# Patient Record
Sex: FEMALE | Race: WHITE | NOT HISPANIC OR LATINO | Employment: FULL TIME
[De-identification: names, ages, dates, MRNs, and addresses within clinical notes are randomized per-mention and may not be internally consistent; named-entity substitution may affect disease eponyms.]

---

## 2018-07-04 ENCOUNTER — HEALTH MAINTENANCE LETTER (OUTPATIENT)
Age: 53
End: 2018-07-04

## 2018-08-16 ENCOUNTER — OFFICE VISIT (OUTPATIENT)
Dept: FAMILY MEDICINE | Facility: CLINIC | Age: 53
End: 2018-08-16

## 2018-08-16 VITALS
DIASTOLIC BLOOD PRESSURE: 86 MMHG | SYSTOLIC BLOOD PRESSURE: 142 MMHG | HEART RATE: 78 BPM | OXYGEN SATURATION: 98 % | BODY MASS INDEX: 45.99 KG/M2 | WEIGHT: 293 LBS | RESPIRATION RATE: 16 BRPM | HEIGHT: 67 IN

## 2018-08-16 DIAGNOSIS — E66.01 MORBID OBESITY (H): ICD-10-CM

## 2018-08-16 DIAGNOSIS — M25.521 ARTHRALGIA OF BOTH ELBOWS: ICD-10-CM

## 2018-08-16 DIAGNOSIS — M54.9 MID BACK PAIN ON LEFT SIDE: Primary | ICD-10-CM

## 2018-08-16 DIAGNOSIS — R19.4 CHANGE IN BOWEL HABITS: ICD-10-CM

## 2018-08-16 DIAGNOSIS — R10.32 LEFT LOWER QUADRANT PAIN: ICD-10-CM

## 2018-08-16 DIAGNOSIS — Z13.6 SCREENING FOR CARDIOVASCULAR CONDITION: ICD-10-CM

## 2018-08-16 DIAGNOSIS — M25.522 ARTHRALGIA OF BOTH ELBOWS: ICD-10-CM

## 2018-08-16 DIAGNOSIS — R53.83 OTHER FATIGUE: ICD-10-CM

## 2018-08-16 LAB
% GRANULOCYTES: 65.7 % (ref 42.2–75.2)
ERYTHROCYTE [SEDIMENTATION RATE] IN BLOOD: 15 MM/HR (ref 0–20)
HCT VFR BLD AUTO: 34.2 % (ref 35–46)
HEMOGLOBIN: 11.3 G/DL (ref 11.8–15.5)
LYMPHOCYTES NFR BLD AUTO: 27.3 % (ref 20.5–51.1)
MCH RBC QN AUTO: 25.8 PG (ref 27–31)
MCHC RBC AUTO-ENTMCNC: 33.2 G/DL (ref 33–37)
MCV RBC AUTO: 77.7 FL (ref 80–100)
MONOCYTES NFR BLD AUTO: 7 % (ref 1.7–9.3)
PLATELET # BLD AUTO: 431 K/UL (ref 140–450)
RBC # BLD AUTO: 4.39 X10/CMM (ref 3.7–5.2)
WBC # BLD AUTO: 11 X10/CMM (ref 3.8–11)

## 2018-08-16 PROCEDURE — 36415 COLL VENOUS BLD VENIPUNCTURE: CPT | Performed by: FAMILY MEDICINE

## 2018-08-16 PROCEDURE — 86140 C-REACTIVE PROTEIN: CPT | Mod: 90 | Performed by: FAMILY MEDICINE

## 2018-08-16 PROCEDURE — 80053 COMPREHEN METABOLIC PANEL: CPT | Mod: 90 | Performed by: FAMILY MEDICINE

## 2018-08-16 PROCEDURE — 80050 GENERAL HEALTH PANEL: CPT | Mod: 90 | Performed by: FAMILY MEDICINE

## 2018-08-16 PROCEDURE — 86787 VARICELLA-ZOSTER ANTIBODY: CPT | Mod: 90 | Performed by: FAMILY MEDICINE

## 2018-08-16 PROCEDURE — 84439 ASSAY OF FREE THYROXINE: CPT | Mod: 90 | Performed by: FAMILY MEDICINE

## 2018-08-16 PROCEDURE — 99204 OFFICE O/P NEW MOD 45 MIN: CPT | Performed by: FAMILY MEDICINE

## 2018-08-16 PROCEDURE — 84480 ASSAY TRIIODOTHYRONINE (T3): CPT | Mod: 90 | Performed by: FAMILY MEDICINE

## 2018-08-16 PROCEDURE — 85025 COMPLETE CBC W/AUTO DIFF WBC: CPT | Performed by: FAMILY MEDICINE

## 2018-08-16 PROCEDURE — 85651 RBC SED RATE NONAUTOMATED: CPT | Performed by: FAMILY MEDICINE

## 2018-08-16 PROCEDURE — 84443 ASSAY THYROID STIM HORMONE: CPT | Mod: 90 | Performed by: FAMILY MEDICINE

## 2018-08-16 RX ORDER — ACETAMINOPHEN AND CODEINE PHOSPHATE 120; 12 MG/5ML; MG/5ML
1 SOLUTION ORAL DAILY
COMMUNITY

## 2018-08-16 RX ORDER — OMEGA-3 FATTY ACIDS/FISH OIL 300-1000MG
800 CAPSULE ORAL PRN
COMMUNITY

## 2018-08-16 NOTE — MR AVS SNAPSHOT
After Visit Summary   8/16/2018    Ashli Narvaez    MRN: [id number]           Patient Information     Date Of Birth          1965        Visit Information        Provider Department      8/16/2018 1:45 PM Yandy Harris MD [facility]        Today's Diagnoses     Mid back pain on left side    -  1    Morbid obesity (H)        Left lower quadrant pain        Screening for cardiovascular condition        Other fatigue        Change in bowel habits        Arthralgia of both elbows           Follow-ups after your visit        Additional Services     BARIATRIC ADULT REFERRAL       [facility] [phone]. [email or web address]    **Bariatric Nutrition Counseling**    Please be aware that coverage of these services is subject to the terms and limitations of your health insurance plan.  Call member services at your health plan with any benefit or coverage questions.      Please bring the following with you to your appointment:      (1) List of current medications   (2) This referral request   (3) Any documents/labs given to you for this referral            BARIATRIC ADULT REFERRAL       Your provider [facility] [phone]. [email or web address]    Please be aware that coverage of these services is subject to the terms and limitations of your health insurance plan.  Call member services at your health plan with any benefit or coverage questions.      Please bring the following with you to your appointment:      (1) List of current medications   (2) This referral request   (3) Any documents/labs given to you for this referral            GASTROENTEROLOGY ADULT REF PROCEDURE ONLY       Reason for Colonoscopy  Change in bowel habits    Last Lab Result: No results found for: CR  Body mass index is 57.04 kg/(m^2).     Needed:  No  Language:  English    Patient will be contacted to schedule procedure.     Please be aware that coverage of these services is subject to the terms and limitations of your health insurance plan.  Call member services at your health plan with any benefit or coverage questions.  Any procedures must be performed at a [facility] facility OR coordinated by your clinic's referral office.    Please bring the following with you to your appointment:    (1) Any X-Rays, CTs or MRIs which have been performed.  Contact the facility where they were done to arrange for  prior to your scheduled appointment.    (2) List of current medications   (3) This referral request   (4) Any documents/labs given to you for this referral            Referral to [facility]       Referral to [facility]  Phone:  [phone]    Reason for Referral:  Consult, Diagnose & Treat, Ongoing elbow pain, bilaterally    Please be aware that coverage of these services is subject to the terms and limitations of your health insurance plan.  Call member services at your health plan with any benefit or coverage questions.                  Who to contact     If you have questions or need follow up information about today's clinic visit or your schedule please contact [facility] directly at [phone].  Normal or non-critical lab and imaging results will be communicated to you by MyChart, letter or phone within 4 business days after the clinic has received the results. If you do not hear from us within 7 days, please contact the clinic through MyChart or phone. If you have a critical or abnormal lab result, we will notify you by phone as soon as possible.  Submit refill requests through [employer] or call your pharmacy and they will forward the refill request to  "us. Please allow 3 business days for your refill to be completed.          Additional Information About Your Visit        [facility] Information     [employer] lets you send messages to your doctor, view your test results, renew your prescriptions, schedule appointments and more. To sign up, go to [email or web address] . Click on \"Log in\" on the left side of the screen, which will take you to the Welcome page. Then click on \"Sign up Now\" on the right side of the page.     You will be asked to enter the access code listed below, as well as some personal information. Please follow the directions to create your username and password.     Your access code is: [email or web address]  Expires: 2018  3:36 PM     Your access code will  in 90 days. If you need help or a new code, please call your [facility] clinic or [phone].        Care EveryWhere ID     This is your Care EveryWhere ID. This could be used by other organizations to access your [facility] medical records  [id number]        Your Vitals Were     Pulse Respirations Height Last Period Pulse Oximetry Breastfeeding?    78 16 1.702 m (5' 7\") 2018 98% No    BMI (Body Mass Index)                   57.04 kg/m2            Blood Pressure from Last 3 Encounters:   18 142/86    Weight from Last 3 Encounters:   18 (!) 165.2 kg (364 lb 3.2 oz)              We Performed the Following     BARIATRIC ADULT REFERRAL     BARIATRIC ADULT REFERRAL     C-Reactive Protein  Quant ([facility])     CBC with Diff/Plt ([facility])     Comp. Metabolic Panel (14) ([facility])     GASTROENTEROLOGY ADULT REF PROCEDURE ONLY     Referral to [facility]     Sed Rate Westergren ([facility])     Thyroxine (T4) Free  Direct  S ([facility])     Triiodothyronine (T3) ([facility])     TSH ([facility])     Varicella-Zoster Ab  IgM ([facility])        Primary Care Provider Office Phone # Fax #    Yandy Denise Harris [phone]       [address] " [facility] [address]        Equal Access to Services     [facility] PavilionESTHELA : Hadii aad ku hadamialda Sojollyali, wafranciscoda luqadaha, qaybta kaalmaabran torresramónthom wagner. So [facility] [phone].    ATENCIÓN: Si habla español, tiene a damon disposición servicios gratuitos de asistencia lingüística. Llame al [phone].    We comply with applicable federal civil rights laws and [place] laws. We do not discriminate on the basis of race, color, national origin, age, disability, sex, sexual orientation, or gender identity.            Thank you!     Thank you for choosing [facility]  for your care. Our goal is always to provide you with excellent care. Hearing back from our patients is one way we can continue to improve our services. Please take a few minutes to complete the written survey that you may receive in the mail after your visit with us. Thank you!             Your Updated Medication List - Protect others around you: Learn how to safely use, store and throw away your medicines at www.disposemymeds.org.          This list is accurate as of 8/16/18  3:36 PM.  Always use your most recent med list.                   Brand Name Dispense Instructions for use Diagnosis    ibuprofen 200 MG capsule      Take 800 mg by mouth as needed        norethindrone 0.35 MG per tablet    MICRONOR     Take 1 tablet by mouth daily

## 2018-08-16 NOTE — LETTER
[facility]  [address] [place] [address]  [phone]      August 16, 2018      Ashli Narvaez  [address]  [facility] [address]      EMERGENCY CARE PLAN  Presenting Problem Treatment Plan   Questions or concerns during clinic hours I will call the clinic directly:    [facility]  [address] [place] [address]  [phone]   Questions or concerns outside clinic hours I will call the after-hours on-call line at [phone]   Patient needs to schedule an appointment I will call the scheduling team during business hours at [phone]   Same day treatment  I will call the clinic first, then urgent care and express care if needed   Clinic Care Coordinators GORAN Valentine:  [phone]  Ankita Quintana RN: [phone]   Crisis Services:  Behavioral or Mental Health [facility] ([facility])   [phone]   Emergency treatment--Immediately CALL 1

## 2018-08-16 NOTE — PROGRESS NOTES
"Problem(s) Oriented visit        SUBJECTIVE:                                                    Ashli Narvaez is a 53 year old female who presents to clinic today for establishing care. [facility]. Pelvic U/S done recently and was reported as normal.    She reports about 3 months of feeling \"awful.\" She started with terrible pain on the left back that would come across her back to her abdomen. It feels like a toothache pain. Her bowel movements are more loose. She now feels that she needs to move her bowels right away after eating. The pain never crossed over the midline, either on the abdomen or the back. No hematochezia or melena.    She complains of significantly painful bilateral elbow pain that feel as though they will come out of joint if she fully extends her elbows. This correlates in time with the terrible back pain. No finger, wrist, shoulder, foot, or ankle pain. She does have bilateral knee pain which has worsened in the past three months as well. No obvious swelling or redness in the joints.    She snores, but no apneic breathing. She wakes fine, but is quite a bit more tired than usual in the past three months. No fevers. No unexplained weight change.       Problem list, Medication list, Allergies, and Medical/Social/Surgical histories reviewed in EPIC and updated as appropriate.   Additional history: as documented    ROS:  7 point ROS completed and negative except noted above, including Gen, CV, Resp, GI, MS, Derm, psych. She does report heavier menses that was worked up by GYN.      Histories:   Patient Active Problem List   Diagnosis     Morbid obesity (H)     No past surgical history on file.    Social History   Substance Use Topics     Smoking status: Never Smoker     Smokeless tobacco: Never Used     Alcohol use Not on file     Family History   Problem Relation Age of Onset     Pulmonary Embolism Mother 50     Lung Cancer Brother            OBJECTIVE:                 " "                                   /86  Pulse 78  Resp 16  Ht 1.702 m (5' 7\")  Wt (!) 165.2 kg (364 lb 3.2 oz)  LMP 07/27/2018  SpO2 98%  Breastfeeding? No  BMI 57.04 kg/m2  Body mass index is 57.04 kg/(m^2).   GENERAL APPEARANCE ADULT: Alert, no acute distress, morbidly obese  NECK: No adenopathy,masses or thyromegaly  RESP: lungs clear to auscultation   CV: normal rate, regular rhythm, no murmur or gallop  ABDOMEN: difficult exam due to morbid obesity, soft, diffuse mild tenderness without rebound or guarding, non-distended, no appreciable masses  MS: pitting edema is seen in both feet  SKIN: no suspicious lesions or rashes, specifically no vesicles or erythema in the area where the painful ache has been on the left abdomen and back.  NEURO: Alert, oriented, speech and mentation normal  PSYCH: mentation appears normal, affect and mood normal   Labs Resulted Today:   Results for orders placed or performed in visit on 08/16/18   CBC with Diff/Plt ([facility])   Result Value Ref Range    WBC x10/cmm 11.0 3.8 - 11.0 x10/cmm    % Lymphocytes 27.3 20.5 - 51.1 %    % Monocytes 7.0 1.7 - 9.3 %    % Granulocytes 65.7 42.2 - 75.2 %    RBC x10/cmm 4.39 3.7 - 5.2 x10/cmm    Hemoglobin 11.3 (A) 11.8 - 15.5 g/dl    Hematocrit 34.2 (A) 35 - 46 %    MCV 77.7 (A) 80 - 100 fL    MCH 25.8 (A) 27.0 - 31.0 pg    MCHC 33.2 33.0 - 37.0 g/dL    Platelet Count 431 140 - 450 K/uL     ASSESSMENT/PLAN:                                                        Ashli was seen today for new patient.    Diagnoses and all orders for this visit:    Mid back pain on left side/Left lower quadrant pain  -     Sed Rate Xavier ([facility])  -     C-Reactive Protein  Quant ([facility])  -     Varicella-Zoster Ab  IgM ([facility])  I'm highly suspicious for varicella-zoster given the distribution of and description of her pain. Follow-up pending lab results.    Morbid obesity (H)  -     BARIATRIC ADULT REFERRAL for nutrition counseling.  She is adamant that " she does not want to consider surgery at this time.  She is encouraged to be open minded about the possibility of bariatric surgery.  Co-morbidities associated with morbid obesity we discussed at length.    Screening for cardiovascular condition  -     Comp. Metabolic Panel (14) ([facility])    Other fatigue  -     Sed Rate Westergren ([facility])  -     C-Reactive Protein  Quant ([facility])  -     CBC with Diff/Plt ([facility])  -     TSH ([facility])  -     Thyroxine (T4) Free  Direct  S ([facility])  -     Triiodothyronine (T3) ([facility])    Change in bowel habits  -     GASTROENTEROLOGY ADULT REF PROCEDURE ONLY, no history of colonoscopy, need to proceed with one now considering her discomfort and change in bowel habits.    Arthralgia of both elbows  -     Referral to [facility]      There are no Patient Instructions on file for this visit.    The following health maintenance items are reviewed in Epic and correct as of today:  Health Maintenance   Topic Date Due     PHQ-2 Q1 YR  02/24/1978     HIV SCREEN (SYSTEM ASSIGNED)  02/24/1984     PAP SCREENING Q3 YR (SYSTEM ASSIGNED)  02/24/1987     LIPID SCREEN Q5 YR FEMALE (SYSTEM ASSIGNED)  02/24/2011     MAMMO SCREEN Q2 YR (SYSTEM ASSIGNED)  02/24/2016     COLON CANCER SCREEN (SYSTEM ASSIGNED)  02/24/2016     INFLUENZA VACCINE (1) 09/01/2018     TETANUS IMMUNIZATION (SYSTEM ASSIGNED)  11/19/2024       Yandy Harris MD  [facility]  [facility]  [phone]    For any issues my office # is [phone]

## 2018-08-20 LAB
ALBUMIN SERPL-MCNC: 4.2 G/DL (ref 3.5–5.5)
ALBUMIN/GLOB SERPL: 1.5 {RATIO} (ref 1.2–2.2)
ALP SERPL-CCNC: 101 IU/L (ref 39–117)
ALT SERPL-CCNC: 11 IU/L (ref 0–32)
AST SERPL-CCNC: 9 IU/L (ref 0–40)
BILIRUB SERPL-MCNC: <0.2 MG/DL (ref 0–1.2)
BUN SERPL-MCNC: 11 MG/DL (ref 6–24)
BUN/CREATININE RATIO: 14 (ref 9–23)
CALCIUM SERPL-MCNC: 9 MG/DL (ref 8.7–10.2)
CHLORIDE SERPLBLD-SCNC: 106 MMOL/L (ref 96–106)
CREAT SERPL-MCNC: 0.79 MG/DL (ref 0.57–1)
CRP SERPL-MCNC: 10.2 MG/L (ref 0–4.9)
EGFR IF AFRICN AM: 100 ML/MIN/1.73
EGFR IF NONAFRICN AM: 86 ML/MIN/1.73
GLOBULIN, TOTAL: 2.8 G/DL (ref 1.5–4.5)
GLUCOSE SERPL-MCNC: 84 MG/DL (ref 65–99)
POTASSIUM SERPL-SCNC: 4.7 MMOL/L (ref 3.5–5.2)
PROT SERPL-MCNC: 7 G/DL (ref 6–8.5)
SODIUM SERPL-SCNC: 144 MMOL/L (ref 134–144)
T3 SERPL-MCNC: 123 NG/DL (ref 71–180)
T4 FREE SERPL-MCNC: 0.89 NG/DL (ref 0.82–1.77)
TOTAL CO2: 26 MMOL/L (ref 20–29)
TSH BLD-ACNC: 2.74 UIU/ML (ref 0.45–4.5)
VZV IGG SER QL IA: <0.91 INDEX (ref 0–0.9)

## 2018-08-23 ENCOUNTER — TELEPHONE (OUTPATIENT)
Dept: FAMILY MEDICINE | Facility: CLINIC | Age: 53
End: 2018-08-23

## 2018-08-23 DIAGNOSIS — E61.1 LOW IRON: Primary | ICD-10-CM

## 2018-08-23 RX ORDER — FERROUS SULFATE 325(65) MG
325 TABLET ORAL
Qty: 90 TABLET | Refills: 2 | COMMUNITY
Start: 2018-08-23

## 2018-08-23 NOTE — TELEPHONE ENCOUNTER
Called patient with lab results.  Informed her of normal labs-sed rate, shingles, thyroid, glucose, electrolytes, kidney function and liver function.  Blood counts are mildly low and indicates patient has iron deficiency anemia.  Recommended is for patient to take ferrous sulfate 325 mg daily with stool softener.  She will take this for 2 months and then repeat blood work.

## 2018-08-23 NOTE — TELEPHONE ENCOUNTER
Also asked patient about flank pain and she says it has improved.  Not totally gone but she will observe and see if it gets worse she will call and make an appointment.

## 2020-01-29 ENCOUNTER — OFFICE VISIT (OUTPATIENT)
Dept: ORTHOPEDICS | Facility: CLINIC | Age: 55
End: 2020-01-29
Payer: COMMERCIAL

## 2020-01-29 ENCOUNTER — ANCILLARY PROCEDURE (OUTPATIENT)
Dept: GENERAL RADIOLOGY | Facility: CLINIC | Age: 55
End: 2020-01-29
Attending: PEDIATRICS
Payer: COMMERCIAL

## 2020-01-29 VITALS
BODY MASS INDEX: 45.99 KG/M2 | SYSTOLIC BLOOD PRESSURE: 142 MMHG | HEIGHT: 67 IN | DIASTOLIC BLOOD PRESSURE: 92 MMHG | WEIGHT: 293 LBS

## 2020-01-29 DIAGNOSIS — M25.562 ACUTE PAIN OF LEFT KNEE: ICD-10-CM

## 2020-01-29 DIAGNOSIS — M25.562 ACUTE PAIN OF LEFT KNEE: Primary | ICD-10-CM

## 2020-01-29 PROCEDURE — 73562 X-RAY EXAM OF KNEE 3: CPT

## 2020-01-29 PROCEDURE — 99204 OFFICE O/P NEW MOD 45 MIN: CPT | Performed by: PEDIATRICS

## 2020-01-29 RX ORDER — DICLOFENAC SODIUM 75 MG/1
75 TABLET, DELAYED RELEASE ORAL 2 TIMES DAILY PRN
Qty: 60 TABLET | Refills: 0 | Status: SHIPPED | OUTPATIENT
Start: 2020-01-29 | End: 2020-03-02

## 2020-01-29 ASSESSMENT — MIFFLIN-ST. JEOR: SCORE: 2256.97

## 2020-01-29 NOTE — LETTER
[facility]  [address]  [place] [address]  Phone: [phone]  Fax: [phone]      January 29, 2020      RE: Ashli Narvaez  [address]  [facility] [address]        To whom it may concern:    Ashli Narvaez was seen today for a musculoskeletal issue. The employee is ABLE to return to work next scheduled work date.        Sincerely,            Rylan PRESSLEY.

## 2020-01-29 NOTE — PATIENT INSTRUCTIONS
Left knee pain likely from early underlying degenerative change, particularly under the kneecap.  For this, discussed icing, anti-inflammatory medication.  Diclofenac prescription placed. Do not use ibuprofen with diclofenac.  May use acetaminophen with diclofenac.   Over the counter medication: Acetaminophen (Tylenol) 1000mg every 6 hours with food (Maximum of 3000mg/day)  Monitor for next 1-2 weeks with therapy and medication. If ongoing symptoms, we discussed potential for doing an MRI (if not improving, or worsening/changing), or possibly physical therapy (some improvement but not complete).

## 2020-01-29 NOTE — LETTER
1/29/2020         RE: Ashli Narvaez  [address]  [facility] [address]        Dear Colleague,    Thank you for referring your patient, Ashli Narvaez, to the [facility] [place]. Please see a copy of my visit note below.    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  Pain has been present for about 1 month with no known injury.  She has had an increase in pain for the past 3 days.  She is unsure if she did anything, as she does not recall, but remembers walking in an icy parking lot.  Is unable to get her leg into the car, has to physically lift her leg to get into the car.  Feels she is unable to flex her knee.  Pain over the anterior and posterior aspect of her knee.      **    When she first noticed the pain, noticed a slight crackle in her left knee. At first, pain was intermittent. Currently, pain is constant. Notes pain radiates up through thigh and down calf. Last night, noted swelling in left ankle. No recent history of a blood clot. Cannot get into car due to pain.    Takes 800 mg twice per day of Ibuprofen without relief.    Injury: gradual onset     Location of Pain: left knee  Duration of Pain: 1 month(s)  Rating of Pain at worst: 9/10  Rating of Pain Currently: 6/10  Symptoms are better with: Nothing  Symptoms are worse with: flexion, walking   Additional Features:   Positive: popping   Negative: swelling, bruising, grinding, catching, locking, instability, paresthesias, numbness, weakness, pain in other joints and systemic symptoms  Other evaluation and/or treatments so far consists of: Ice  Prior History of related problems: denies     Social History:      Review of Systems  Musculoskeletal: as above  Remainder of review of systems is negative including constitutional, CV, pulmonary, GI, Skin and Neurologic except as  noted in HPI or medical history.      Patient Active Problem List   Diagnosis     Morbid obesity (H)       Past Medical History:   Diagnosis Date     Depressive disorder        Past Surgical History:   Procedure Laterality Date     NO HISTORY OF SURGERY         No significant past surgical history    Family History   Problem Relation Age of Onset     Pulmonary Embolism Mother 50     Lung Cancer Brother      Social History     Socioeconomic History     Marital status:      Spouse name: Not on file     Number of children: Not on file     Years of education: Not on file     Highest education level: Not on file   Occupational History     Not on file   Social Needs     Financial resource strain: Not on file     Food insecurity:     Worry: Not on file     Inability: Not on file     Transportation needs:     Medical: Not on file     Non-medical: Not on file   Tobacco Use     Smoking status: Never Smoker     Smokeless tobacco: Never Used   Substance and Sexual Activity     Alcohol use: Not on file     Drug use: Not on file     Sexual activity: Not on file   Lifestyle     Physical activity:     Days per week: Not on file     Minutes per session: Not on file     Stress: Not on file   Relationships     Social connections:     Talks on phone: Not on file     Gets together: Not on file     Attends Episcopal service: Not on file     Active member of club or organization: Not on file     Attends meetings of clubs or organizations: Not on file     Relationship status: Not on file     Intimate partner violence:     Fear of current or ex partner: Not on file     Emotionally abused: Not on file     Physically abused: Not on file     Forced sexual activity: Not on file   Other Topics Concern     Not on file   Social History Narrative     Not on file     This document serves as a record of the services and decisions personally performed and made by DO NAVIN Burris. It was created on his behalf by soni Jackson  "medical scribe. The creation of this document is based the provider's statements to the medical scribe.    Ganga Neely 8:58 AM 1/29/2020       Objective  BP (!) 142/92   Ht 1.702 m (5' 7\")   Wt (!) 161.9 kg (357 lb)   BMI 55.91 kg/m       GENERAL APPEARANCE: alert, no distress and obese   GAIT: antalgic  SKIN: no suspicious lesions or rashes  NEURO: Normal strength and tone, mentation intact and speech normal  PSYCH:  mentation appears normal and affect normal/bright  HEENT: no scleral icterus  CV: Distal perfusion intact.   RESP: nonlabored breathing     Left Knee exam    ROM:         Extension: Lacking 10-20 degrees actively, full passive        Active flexion: 90 degrees  Pain limits motion    Inspection:         Mildly swollen appearance with edema in left lower leg near foot         Mild edema right lower leg as well         No visible ecchymosis   No apparent effusion, though somewhat difficult to assess with habitus    Skin:       no visible deformities       well perfused       capillary refill brisk    Patellar Motion:        Crepitus noted in the patellofemoral joint    Tender:         Patellar tendon        Tibial tubercle          Lateral joint line         Lateral aspect of the patella    Non Tender:         remainder of knee area    Strength:        Extension: able to resist with no pain    Special Tests:         Ghislaine with pain to anteromedial aspect       neg (-) anterior drawer       neg (-) posterior drawer with anterior pain       neg (-) varus at 0 and 30 degrees       neg (-) valgus at 0 and 30 degrees  Mild pain with forced extension    Radiology  Visualized radiographs of the bilateral knee obtained today, and reviewed the images with the patient.  Impression: appears to be minimal to mild medial compartment narrowing bilaterally, slightly greater on the right.     Recent Results (from the past 24 hour(s))   XR Knee Standing AP Bilat [place] Bilat Lat Left    Narrative    KNEE " STANDING AP BILATERAL, SUNRISE BILATERAL, LATERAL LEFT 1/29/2020  8:46 AM     HISTORY: Acute pain of left knee.    COMPARISON: None.      Impression    IMPRESSION: Three views of the left knee show no bony or soft tissue  abnormality and no joint space effusion. Two views of the right knee  are included and show no acute bony abnormality, but there is possible  mild medial joint space narrowing.         Assessment:  1. Acute pain of left knee        Plan:  Discussed the assessment with the patient. Favor degenerative change, vs possible meniscus.    See patient instructions. She prefers medication, monitoring to start. Is interested in something stronger than ibuprofen. Discussed other NSAIDs, including diclofenac; she desired to try, rx placed.   Pertinent potential adverse effect profile of prescribed medication(s) was discussed with the patient, who expressed understanding.  **    Radiologic images reviewed and discussed with patient today   We discussed the following: symptom treatment, activity modification/rest, imaging, rehab, medication and support for the affected area. Following discussion, plan:     Topical treatments: Ice/heat prn  OTC medication prn  Medication: May take Acetaminophen (Tylenol) 1000mg every 6 hours with food (Maximum of 3000mg/day). Diclofenac prescribed. Do not take Diclofenac and Ibuprofen together.  Support: May use ambulatory options as desired. She has a cane at home she may use. Compression options reviewed in clinic today.  Imaging: Discussed the possibility of an MRI. Will hold off for now  Rehab: Home exercises reviewed in clinic. PT may be a future consideration.  Follow up: 1-2 weeks if not improving well, sooner if needed  Future considerations: MRI, PT  Questions answered.  Patient demonstrated understanding of these issues and agrees with the plan.     Rylan Lyn DO, CAQ            Patient Instructions   Left knee pain likely from early underlying degenerative  Statement Selected change, particularly under the kneecap.  For this, discussed icing, anti-inflammatory medication.  Diclofenac prescription placed. Do not use ibuprofen with diclofenac.  May use acetaminophen with diclofenac.   Over the counter medication: Acetaminophen (Tylenol) 1000mg every 6 hours with food (Maximum of 3000mg/day)  Monitor for next 1-2 weeks with therapy and medication. If ongoing symptoms, we discussed potential for doing an MRI (if not improving, or worsening/changing), or possibly physical therapy (some improvement but not complete).          Disclaimer: This note consists of symbols derived from keyboarding, dictation and/or voice recognition software. As a result, there may be errors in the script that have gone undetected. Please consider this when interpreting information found in this chart.    The information in this document, created by a scribe for me, accurately reflects the services I personally performed and the decisions made by me. I have reviewed and approved this document for accuracy.            Again, thank you for allowing me to participate in the care of your patient.        Sincerely,        Rylan Lyn, DO

## 2020-01-29 NOTE — PROGRESS NOTES
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  Pain has been present for about 1 month with no known injury.  She has had an increase in pain for the past 3 days.  She is unsure if she did anything, as she does not recall, but remembers walking in an icy parking lot.  Is unable to get her leg into the car, has to physically lift her leg to get into the car.  Feels she is unable to flex her knee.  Pain over the anterior and posterior aspect of her knee.      **    When she first noticed the pain, noticed a slight crackle in her left knee. At first, pain was intermittent. Currently, pain is constant. Notes pain radiates up through thigh and down calf. Last night, noted swelling in left ankle. No recent history of a blood clot. Cannot get into car due to pain.    Takes 800 mg twice per day of Ibuprofen without relief.    Injury: gradual onset     Location of Pain: left knee  Duration of Pain: 1 month(s)  Rating of Pain at worst: 9/10  Rating of Pain Currently: 6/10  Symptoms are better with: Nothing  Symptoms are worse with: flexion, walking   Additional Features:   Positive: popping   Negative: swelling, bruising, grinding, catching, locking, instability, paresthesias, numbness, weakness, pain in other joints and systemic symptoms  Other evaluation and/or treatments so far consists of: Ice  Prior History of related problems: denies     Social History:      Review of Systems  Musculoskeletal: as above  Remainder of review of systems is negative including constitutional, CV, pulmonary, GI, Skin and Neurologic except as noted in HPI or medical history.      Patient Active Problem List   Diagnosis     Morbid obesity (H)       Past Medical History:   Diagnosis Date     Depressive disorder        Past Surgical History:   Procedure Laterality Date     NO HISTORY OF SURGERY    "      No significant past surgical history    Family History   Problem Relation Age of Onset     Pulmonary Embolism Mother 50     Lung Cancer Brother      Social History     Socioeconomic History     Marital status:      Spouse name: Not on file     Number of children: Not on file     Years of education: Not on file     Highest education level: Not on file   Occupational History     Not on file   Social Needs     Financial resource strain: Not on file     Food insecurity:     Worry: Not on file     Inability: Not on file     Transportation needs:     Medical: Not on file     Non-medical: Not on file   Tobacco Use     Smoking status: Never Smoker     Smokeless tobacco: Never Used   Substance and Sexual Activity     Alcohol use: Not on file     Drug use: Not on file     Sexual activity: Not on file   Lifestyle     Physical activity:     Days per week: Not on file     Minutes per session: Not on file     Stress: Not on file   Relationships     Social connections:     Talks on phone: Not on file     Gets together: Not on file     Attends Gnosticist service: Not on file     Active member of club or organization: Not on file     Attends meetings of clubs or organizations: Not on file     Relationship status: Not on file     Intimate partner violence:     Fear of current or ex partner: Not on file     Emotionally abused: Not on file     Physically abused: Not on file     Forced sexual activity: Not on file   Other Topics Concern     Not on file   Social History Narrative     Not on file     This document serves as a record of the services and decisions personally performed and made by DO NAVIN Burris. It was created on his behalf by Leobardo Neely, a trained medical scribe. The creation of this document is based the provider's statements to the medical scribe.    Ganga Neely 8:58 AM 1/29/2020       Objective  BP (!) 142/92   Ht 1.702 m (5' 7\")   Wt (!) 161.9 kg (357 lb)   BMI 55.91 kg/m  "     GENERAL APPEARANCE: alert, no distress and obese   GAIT: antalgic  SKIN: no suspicious lesions or rashes  NEURO: Normal strength and tone, mentation intact and speech normal  PSYCH:  mentation appears normal and affect normal/bright  HEENT: no scleral icterus  CV: Distal perfusion intact.   RESP: nonlabored breathing     Left Knee exam    ROM:         Extension: Lacking 10-20 degrees actively, full passive        Active flexion: 90 degrees  Pain limits motion    Inspection:         Mildly swollen appearance with edema in left lower leg near foot         Mild edema right lower leg as well         No visible ecchymosis   No apparent effusion, though somewhat difficult to assess with habitus    Skin:       no visible deformities       well perfused       capillary refill brisk    Patellar Motion:        Crepitus noted in the patellofemoral joint    Tender:         Patellar tendon        Tibial tubercle          Lateral joint line         Lateral aspect of the patella    Non Tender:         remainder of knee area    Strength:        Extension: able to resist with no pain    Special Tests:         Ghislaine with pain to anteromedial aspect       neg (-) anterior drawer       neg (-) posterior drawer with anterior pain       neg (-) varus at 0 and 30 degrees       neg (-) valgus at 0 and 30 degrees  Mild pain with forced extension    Radiology  Visualized radiographs of the bilateral knee obtained today, and reviewed the images with the patient.  Impression: appears to be minimal to mild medial compartment narrowing bilaterally, slightly greater on the right.     Recent Results (from the past 24 hour(s))   XR Knee Standing AP Bilat [place] Bilat Lat Left    Narrative    KNEE STANDING AP BILATERAL, SUNRISE BILATERAL, LATERAL LEFT 1/29/2020  8:46 AM     HISTORY: Acute pain of left knee.    COMPARISON: None.      Impression    IMPRESSION: Three views of the left knee show no bony or soft tissue  abnormality and no joint  space effusion. Two views of the right knee  are included and show no acute bony abnormality, but there is possible  mild medial joint space narrowing.         Assessment:  1. Acute pain of left knee        Plan:  Discussed the assessment with the patient. Favor degenerative change, vs possible meniscus.    See patient instructions. She prefers medication, monitoring to start. Is interested in something stronger than ibuprofen. Discussed other NSAIDs, including diclofenac; she desired to try, rx placed.   Pertinent potential adverse effect profile of prescribed medication(s) was discussed with the patient, who expressed understanding.  **    Radiologic images reviewed and discussed with patient today   We discussed the following: symptom treatment, activity modification/rest, imaging, rehab, medication and support for the affected area. Following discussion, plan:     Topical treatments: Ice/heat prn  OTC medication prn  Medication: May take Acetaminophen (Tylenol) 1000mg every 6 hours with food (Maximum of 3000mg/day). Diclofenac prescribed. Do not take Diclofenac and Ibuprofen together.  Support: May use ambulatory options as desired. She has a cane at home she may use. Compression options reviewed in clinic today.  Imaging: Discussed the possibility of an MRI. Will hold off for now  Rehab: Home exercises reviewed in clinic. PT may be a future consideration.  Follow up: 1-2 weeks if not improving well, sooner if needed  Future considerations: MRI, PT  Questions answered.  Patient demonstrated understanding of these issues and agrees with the plan.     Rylan Lyn DO, CAQ            Patient Instructions   Left knee pain likely from early underlying degenerative change, particularly under the kneecap.  For this, discussed icing, anti-inflammatory medication.  Diclofenac prescription placed. Do not use ibuprofen with diclofenac.  May use acetaminophen with diclofenac.   Over the counter medication:  Acetaminophen (Tylenol) 1000mg every 6 hours with food (Maximum of 3000mg/day)  Monitor for next 1-2 weeks with therapy and medication. If ongoing symptoms, we discussed potential for doing an MRI (if not improving, or worsening/changing), or possibly physical therapy (some improvement but not complete).          Disclaimer: This note consists of symbols derived from keyboarding, dictation and/or voice recognition software. As a result, there may be errors in the script that have gone undetected. Please consider this when interpreting information found in this chart.    The information in this document, created by a scribe for me, accurately reflects the services I personally performed and the decisions made by me. I have reviewed and approved this document for accuracy.

## 2020-03-02 ENCOUNTER — ANCILLARY PROCEDURE (OUTPATIENT)
Dept: MRI IMAGING | Facility: CLINIC | Age: 55
End: 2020-03-02
Attending: PEDIATRICS
Payer: COMMERCIAL

## 2020-03-02 ENCOUNTER — OFFICE VISIT (OUTPATIENT)
Dept: ORTHOPEDICS | Facility: CLINIC | Age: 55
End: 2020-03-02
Payer: COMMERCIAL

## 2020-03-02 VITALS
BODY MASS INDEX: 45.99 KG/M2 | WEIGHT: 293 LBS | SYSTOLIC BLOOD PRESSURE: 138 MMHG | DIASTOLIC BLOOD PRESSURE: 88 MMHG | HEIGHT: 67 IN

## 2020-03-02 DIAGNOSIS — M25.562 ACUTE PAIN OF LEFT KNEE: Primary | ICD-10-CM

## 2020-03-02 DIAGNOSIS — M25.562 ACUTE PAIN OF LEFT KNEE: ICD-10-CM

## 2020-03-02 PROCEDURE — 73721 MRI JNT OF LWR EXTRE W/O DYE: CPT | Mod: TC

## 2020-03-02 PROCEDURE — 99214 OFFICE O/P EST MOD 30 MIN: CPT | Performed by: PEDIATRICS

## 2020-03-02 RX ORDER — DICLOFENAC SODIUM 75 MG/1
75 TABLET, DELAYED RELEASE ORAL 2 TIMES DAILY PRN
Qty: 60 TABLET | Refills: 0 | Status: SHIPPED | OUTPATIENT
Start: 2020-03-02

## 2020-03-02 ASSESSMENT — MIFFLIN-ST. JEOR: SCORE: 2251.97

## 2020-03-02 NOTE — PATIENT INSTRUCTIONS
Advanced imaging is done by appointment. Some insurance require a prior authorization to be completed which may delay the time until you are able to schedule your appointment.  Please call [place] [place] [facility] [place]: [phone] to schedule your MRI.  Depending on your availability you can usually schedule within the next 1-2 days.    The clinic will call you with results, if you have not heard from the clinic within 3-4 days following your MRI please contact us at the number listed below.   If you have any further questions for your physician or physician s care team you can call [phone] and use option 3 to leave a voice message. Calls received during business hours will be returned same day.

## 2020-03-02 NOTE — PROGRESS NOTES
"Sports Medicine Clinic Visit    PCP: No Ref-Primary, Physician    Ashli Narvaez is a 55 year old female who is seen in f/u up for Acute pain of left knee. Since last visit on 1/29/2020 patient has had an increase pain over the past 2 weeks.  She was improving after her first visit, but 2 weeks she had an incident where her knee gave out on her and she has noticed an increase in pain and swelling.  She did use the diclofenac and acetaminophen, which were helpful.  She is currently out of the diclofenac.  Has continued with ice and heat.  She does feel pain in the posterior knee, described as a geeta horse.    **  After last visit, had some improvement. No longer limping.  ~2 weeks ago had a painful pop with giving way. Continued symptoms since that time. Feels swollen, warm.  Sometimes in posterior knee feels like cramp.  No additional crack/noise since then.  Difficult to do sit to stand and then walk.    Pain anterior primarily.    Review of Systems  All other systems reviewed and are negative unless noted above.    Past Medical History:   Diagnosis Date     Depressive disorder      Past Surgical History:   Procedure Laterality Date     NO HISTORY OF SURGERY         Objective  /88   Ht 1.702 m (5' 7\")   Wt (!) 161.9 kg (357 lb)   BMI 55.91 kg/m      GENERAL APPEARANCE:  alert, no distress and obese   GAIT: antalgic  SKIN: no suspicious lesions or rashes  NEURO: Normal strength and tone, mentation intact and speech normal  PSYCH:  mentation appears normal and affect normal/bright  HEENT: no scleral icterus  CV: distal perfusion intact  RESP: nonlabored breathing      Exam  Left Knee exam    ROM:         Extension: grossly full active, with pain        Active flexion: >100 degrees  Pain limits motion    Inspection:        No visible ecchymosis   No apparent effusion, though somewhat difficult to assess with habitus; does have some visible fullness at knee    Skin:       no visible deformities       well " perfused       capillary refill brisk    Patellar Motion:        Crepitus noted in the patellofemoral joint    Tender:         Patellar tendon        Tibial tubercle          Lateral joint line         Lateral aspect of the patella  Superior patella, quad tendon    Non Tender:         remainder of knee area    Strength:        Extension: able to resist     Special Tests:         Ghislaine with pain to anteromedial aspect       neg (-) anterior drawer with anterior pain       neg (-) posterior drawer with anterior pain       neg (-) varus at 0 and 30 degrees       neg (-) valgus at 0 and 30 degrees  Mild pain with forced extension    Radiology  Prior x-ray reviewed:    Recent Results (from the past 24 hour(s))   XR Knee Standing AP Bilat [place] Bilat Lat Left    Narrative    KNEE STANDING AP BILATERAL, SUNRISE BILATERAL, LATERAL LEFT 1/29/2020  8:46 AM     HISTORY: Acute pain of left knee.    COMPARISON: None.      Impression    IMPRESSION: Three views of the left knee show no bony or soft tissue  abnormality and no joint space effusion. Two views of the right knee  are included and show no acute bony abnormality, but there is possible  mild medial joint space narrowing.         Assessment:  1. Acute pain of left knee        Plan:  Discussed the assessment with the patient.  Ongoing symptoms at left knee. Favor patellofemoral source given crepitus. Other internal derangement not excluded.  Discussed primary considerations of physical therapy and MRI next. Plan MRI. Potential PT pending results. Also discussed potential future consideration of injection pending results, though she is not particularly interested currently.   Refill diclofenac placed.   Pertinent potential adverse effect profile of prescribed medication(s) was discussed with the patient, who expressed understanding.  Updated letter for work.  Follow up: call with MRI results.  Questions answered. The patient indicates understanding of these issues and  agrees with the plan.    Rylan Lyn DO, CAQ            Patient Instructions    Advanced imaging is done by appointment. Some insurance require a prior authorization to be completed which may delay the time until you are able to schedule your appointment.  Please call [place] [place] [facility] [place]: [phone] to schedule your MRI.  Depending on your availability you can usually schedule within the next 1-2 days.    The clinic will call you with results, if you have not heard from the clinic within 3-4 days following your MRI please contact us at the number listed below.   If you have any further questions for your physician or physician s care team you can call [phone] and use option 3 to leave a voice message. Calls received during business hours will be returned same day.              Disclaimer: This note consists of symbols derived from keyboarding, dictation and/or voice recognition software. As a result, there may be errors in the script that have gone undetected. Please consider this when interpreting information found in this chart.

## 2020-03-02 NOTE — LETTER
[facility] [place]  [address] [address]  [place] [address]  Phone: [phone]  Fax: [phone]      March 2, 2020      RE: Ashli Narvaez  [address]  [facility] [address]        To whom it may concern:    Ashli Narvaez was seen today in [facility] for a musculoskeletal issue. The employee is ABLE to return to work next scheduled work date. Planning additional imaging with MRI next.        Sincerely,            Rylan GONSALVES

## 2020-03-02 NOTE — LETTER
"    3/2/2020         RE: Ashli Narvaez  [address]  [facility] [address]        Dear Colleague,    Thank you for referring your patient, Ashli Narvaez, to the [facility]. Please see a copy of my visit note below.    Sports Medicine Clinic Visit    PCP: No Ref-Primary, Physician    Ashli Narvaez is a 55 year old female who is seen in f/u up for Acute pain of left knee. Since last visit on 1/29/2020 patient has had an increase pain over the past 2 weeks.  She was improving after her first visit, but 2 weeks she had an incident where her knee gave out on her and she has noticed an increase in pain and swelling.  She did use the diclofenac and acetaminophen, which were helpful.  She is currently out of the diclofenac.  Has continued with ice and heat.  She does feel pain in the posterior knee, described as a geeta horse.    **  After last visit, had some improvement. No longer limping.  ~2 weeks ago had a painful pop with giving way. Continued symptoms since that time. Feels swollen, warm.  Sometimes in posterior knee feels like cramp.  No additional crack/noise since then.  Difficult to do sit to stand and then walk.    Pain anterior primarily.    Review of Systems  All other systems reviewed and are negative unless noted above.    Past Medical History:   Diagnosis Date     Depressive disorder      Past Surgical History:   Procedure Laterality Date     NO HISTORY OF SURGERY         Objective  /88   Ht 1.702 m (5' 7\")   Wt (!) 161.9 kg (357 lb)   BMI 55.91 kg/m       GENERAL APPEARANCE:  alert, no distress and obese   GAIT: antalgic  SKIN: no suspicious lesions or rashes  NEURO: Normal strength and tone, mentation intact and speech normal  PSYCH:  mentation appears normal and affect normal/bright  HEENT: no scleral icterus  CV: distal perfusion intact  RESP: nonlabored breathing      Exam  Left Knee exam    ROM:         Extension: grossly full active, with pain        " Active flexion: >100 degrees  Pain limits motion    Inspection:        No visible ecchymosis   No apparent effusion, though somewhat difficult to assess with habitus; does have some visible fullness at knee    Skin:       no visible deformities       well perfused       capillary refill brisk    Patellar Motion:        Crepitus noted in the patellofemoral joint    Tender:         Patellar tendon        Tibial tubercle          Lateral joint line         Lateral aspect of the patella  Superior patella, quad tendon    Non Tender:         remainder of knee area    Strength:        Extension: able to resist     Special Tests:         Ghislaine with pain to anteromedial aspect       neg (-) anterior drawer with anterior pain       neg (-) posterior drawer with anterior pain       neg (-) varus at 0 and 30 degrees       neg (-) valgus at 0 and 30 degrees  Mild pain with forced extension    Radiology  Prior x-ray reviewed:    Recent Results (from the past 24 hour(s))   XR Knee Standing AP Bilat [place] Bilat Lat Left    Narrative    KNEE STANDING AP BILATERAL, SUNRISE BILATERAL, LATERAL LEFT 1/29/2020  8:46 AM     HISTORY: Acute pain of left knee.    COMPARISON: None.      Impression    IMPRESSION: Three views of the left knee show no bony or soft tissue  abnormality and no joint space effusion. Two views of the right knee  are included and show no acute bony abnormality, but there is possible  mild medial joint space narrowing.         Assessment:  1. Acute pain of left knee        Plan:  Discussed the assessment with the patient.  Ongoing symptoms at left knee. Favor patellofemoral source given crepitus. Other internal derangement not excluded.  Discussed primary considerations of physical therapy and MRI next. Plan MRI. Potential PT pending results. Also discussed potential future consideration of injection pending results, though she is not particularly interested currently.   Refill diclofenac placed.   Pertinent  potential adverse effect profile of prescribed medication(s) was discussed with the patient, who expressed understanding.  Updated letter for work.  Follow up: call with MRI results.  Questions answered. The patient indicates understanding of these issues and agrees with the plan.    Rylan Lyn DO, NAVIN            Patient Instructions    Advanced imaging is done by appointment. Some insurance require a prior authorization to be completed which may delay the time until you are able to schedule your appointment.  Please call [place] [place] [facility]: [phone] to schedule your MRI.  Depending on your availability you can usually schedule within the next 1-2 days.    The clinic will call you with results, if you have not heard from the clinic within 3-4 days following your MRI please contact us at the number listed below.   If you have any further questions for your physician or physician s care team you can call [phone] and use option 3 to leave a voice message. Calls received during business hours will be returned same day.              Disclaimer: This note consists of symbols derived from keyboarding, dictation and/or voice recognition software. As a result, there may be errors in the script that have gone undetected. Please consider this when interpreting information found in this chart.        Again, thank you for allowing me to participate in the care of your patient.        Sincerely,        Rylan Lyn DO

## 2020-03-03 ENCOUNTER — TELEPHONE (OUTPATIENT)
Dept: ORTHOPEDICS | Facility: CLINIC | Age: 55
End: 2020-03-03

## 2020-03-03 NOTE — TELEPHONE ENCOUNTER
Results for orders placed or performed in visit on 03/02/20   MR Knee Left w/o Contrast    Narrative    EXAMINATION: MRI of the left knee without contrast    DATE:  3/2/2020.    HISTORY: Ongoing knee pain.    TECHNIQUE: Multiplanar, multisequence MR imaging of the left knee was  obtained using standard sequences in orthogonal planes without the use  of intravenous or intra-articular gadolinium contrast.    Comparison:  Comparison radiographs dated 1/29/2020 were reviewed,  which demonstrated no acute bone abnormality.    FINDINGS:    In the medial compartment, there is complex tearing of the meniscus  with a large radial tear involving the posterior horn of the medial  meniscus with a large gap between the meniscal fragments measuring at  least 9 mm on coronal series image 21. There is peripheral extrusion  of the body of the medial meniscus with degeneration in the posterior  root ligament. There is longitudinal horizontal tearing involving the  meniscus. There is diffuse high-grade loss of the articular cartilage  in the medial femorotibial joint compartment with reactive bone marrow  edema along the medial tibial plateau and in the region of the tibial  spines.    In the lateral compartment, the lateral meniscus is intact. There is  mild diffuse cartilage thinning with a focal area of high grade  cartilage fissuring along the lateral tibial plateau without  subchondral edema.    In the patellofemoral compartment, there is a focal area of high-grade  to full-thickness cartilage fissuring at the median ridge of the  patella with subjacent bone marrow edema..    The anterior and posterior cruciate ligaments are intact.    The tibial collateral ligament is intact. The anterior iliotibial  band, fibular collateral ligament, biceps femoris tendon, and  popliteus tendons are intact.    There is a moderate-sized joint effusion. Trace fluid in the  semimembranosus medial gastrocnemius bursa. No joint bodies.    The  extensor mechanism is intact and normal in appearance.       Impression    IMPRESSION:  1. Moderate size left knee joint effusion with trace fluid in the  semimembranosus medial gastrocnemius bursa.  2. Complex tearing involving the left knee medial meniscus with a  large radial tear involving the posterior horn of the left knee medial  meniscus with peripheral extrusion of the body and degeneration in the  posterior root ligament. There is also a longitudinal horizontal  tearing of the meniscus.  3. Osteoarthrosis in the left knee joint as described above, with  diffuse high-grade cartilage loss in the medial femorotibial joint  compartment as well as a focal area of high grade cartilage fissuring  at the median ridge of the patella.  4. The anterior and posterior cruciate ligaments, medial and lateral  supporting structures, and lateral meniscus are intact.     SHELLY OWENS MD

## 2020-03-03 NOTE — TELEPHONE ENCOUNTER
"Prominent medial meniscus tearing. There is also prominent articular cartilage (think \"cushioning\") thinning in the medial knee with bone marrow edema this area as well. Bone marrow edema is basically reaction to the underlying cartilage issues.  Also patellofemoral compartment cartilage change (fissure, or crack), and some thinning in the lateral compartment also.  Basically, mostly seeing \"wear and tear\" in the knee, with a prominent medial mensicus tear.  Options: 1) trial of steroid injection (not a \"fix\" but is for pain relief; would offer with one of my colleagues for use of ultrasound guidance); 2) referral to see orthopedic surgeon (given the prominent tearing but noting that she does have other degenerative change elsewhere, potential for surgery would be dependent on discussion with surgeon).  Would offer injection if goal is pain relief soon. Otherwise, can refer for further discussion if she wants to go that route.  Thanks.  Rylan Lyn, DO, CAQ      "

## 2020-03-04 NOTE — TELEPHONE ENCOUNTER
Called and spoke with patient.  Relayed results.  She asked if I could repeat the results to her son, relayed message to him as well.  She would like to try injection for immediate pain relief.  Scheduled with Dr. sumner on 3/5/20 at 9 AM.   Will contact the clinic in 2-3 weeks post injection to let us know how she is doing.  May want referral at that point if not improving.    Domi Andrew MS ATC

## 2020-03-05 ENCOUNTER — OFFICE VISIT (OUTPATIENT)
Dept: ORTHOPEDICS | Facility: CLINIC | Age: 55
End: 2020-03-05
Payer: COMMERCIAL

## 2020-03-05 DIAGNOSIS — M25.562 ACUTE PAIN OF LEFT KNEE: Primary | ICD-10-CM

## 2020-03-05 DIAGNOSIS — S83.242D ACUTE MEDIAL MENISCAL TEAR, LEFT, SUBSEQUENT ENCOUNTER: ICD-10-CM

## 2020-03-05 PROCEDURE — 20611 DRAIN/INJ JOINT/BURSA W/US: CPT | Mod: LT | Performed by: FAMILY MEDICINE

## 2020-03-05 RX ORDER — BETAMETHASONE SODIUM PHOSPHATE AND BETAMETHASONE ACETATE 3; 3 MG/ML; MG/ML
6 INJECTION, SUSPENSION INTRA-ARTICULAR; INTRALESIONAL; INTRAMUSCULAR; SOFT TISSUE
Status: DISCONTINUED | OUTPATIENT
Start: 2020-03-05 | End: 2020-04-16

## 2020-03-05 RX ORDER — ROPIVACAINE HYDROCHLORIDE 5 MG/ML
3 INJECTION, SOLUTION EPIDURAL; INFILTRATION; PERINEURAL
Status: SHIPPED | OUTPATIENT
Start: 2020-03-05

## 2020-03-05 RX ADMIN — ROPIVACAINE HYDROCHLORIDE 3 ML: 5 INJECTION, SOLUTION EPIDURAL; INFILTRATION; PERINEURAL at 09:15

## 2020-03-05 RX ADMIN — BETAMETHASONE SODIUM PHOSPHATE AND BETAMETHASONE ACETATE 6 MG: 3; 3 INJECTION, SUSPENSION INTRA-ARTICULAR; INTRALESIONAL; INTRAMUSCULAR; SOFT TISSUE at 09:15

## 2020-03-05 NOTE — PROGRESS NOTES
Large Joint Injection/Arthocentesis: L knee joint  Date/Time: 3/5/2020 9:15 AM  Performed by: Dimitris Quiroz MD  Authorized by: Dimitris Quiroz MD     Indications:  Pain and osteoarthritis  Needle Size:  20 G  Guidance: ultrasound    Approach:  Superolateral  Location:  Knee      Medications:  6 mg betamethasone acet & sod phos 6 (3-3) MG/ML; 3 mL ropivacaine 5 MG/ML  Medications comment:  Actual amount of ropivacaine used 4 mL  Outcome:  Tolerated well, no immediate complications  Procedure discussed: discussed risks, benefits, and alternatives    Consent Given by:  Patient  Timeout: timeout called immediately prior to procedure    Prep: patient was prepped and draped in usual sterile fashion     Patient reported significant improvement of pain after left knee aspiration and steroid injection.  Aftercare instructions given to patient.  Plan to follow-up as previously discussed with referring provider.     Dimitris Quiroz MD [facility]

## 2020-03-05 NOTE — LETTER
3/5/2020         RE: Ashli Narvaez  [address]  [facility] [address]        Dear Colleague,    Thank you for referring your patient, Ashli Narvaez, to the [facility]. Please see a copy of my visit note below.    Large Joint Injection/Arthocentesis: L knee joint  Date/Time: 3/5/2020 9:15 AM  Performed by: Dimitris Quiroz MD  Authorized by: Dimitris Quiroz MD     Indications:  Pain and osteoarthritis  Needle Size:  20 G  Guidance: ultrasound    Approach:  Superolateral  Location:  Knee      Medications:  6 mg betamethasone acet & sod phos 6 (3-3) MG/ML; 3 mL ropivacaine 5 MG/ML  Medications comment:  Actual amount of ropivacaine used 4 mL  Outcome:  Tolerated well, no immediate complications  Procedure discussed: discussed risks, benefits, and alternatives    Consent Given by:  Patient  Timeout: timeout called immediately prior to procedure    Prep: patient was prepped and draped in usual sterile fashion     Patient reported significant improvement of pain after left knee aspiration and steroid injection.  Aftercare instructions given to patient.  Plan to follow-up as previously discussed with referring provider.     Dimitris Quiroz MD [facility]            Again, thank you for allowing me to participate in the care of your patient.        Sincerely,        Dimitris Quiroz MD

## 2020-03-05 NOTE — PATIENT INSTRUCTIONS
[facility] [place]jection Discharge Instructions    Procedure: Left knee aspiration and steroid injection      You may shower, however avoid swimming, tub baths or hot tubs for 24 hours following your procedure    You may have a mild to moderate increase in pain for several days following the injection.    It may take up to 14 days for the steroid medication to start working although you may feel the effect as early as a few days after the procedure.    You may use ice packs for 10-15 minutes, 3 to 4 times a day at the injection site for comfort    You may use anti-inflammatory medications (such as Ibuprofen or Aleve or Advil) or Tylenol for pain control if necessary    If you were fasting, you may resume your normal diet and medications after the procedure    If you have diabetes, check your blood sugar more frequently than usual as your blood sugar may be higher than normal for 10-14 days following a steroid injection. Contact your doctor who manages your diabetes if your blood sugar is higher than usual      If you experience any of the following, call [facility] @ [phone] or [phone]  -Fever over 100 degree F  -Swelling, bleeding, redness, drainage, warmth at the injection site  - New or worsening pain

## 2020-03-23 ENCOUNTER — TELEPHONE (OUTPATIENT)
Dept: ORTHOPEDICS | Facility: CLINIC | Age: 55
End: 2020-03-23
Payer: COMMERCIAL

## 2020-03-23 DIAGNOSIS — M17.12 LOCALIZED OSTEOARTHRITIS OF LEFT KNEE: Primary | ICD-10-CM

## 2020-03-23 NOTE — TELEPHONE ENCOUNTER
----- Message from Saira Morfin sent at 3/23/2020 11:06 AM CDT -----  Good Morning!    This is my first day back since 3/12 and was not aware of the NEW rules for ortho appts.    I did check with her pain severity and she really wanted this week.    Please let me know if you want her rescheduled and I can call her back, or please call her yourself if you have further questions.    Thank you!  -Stephanie

## 2020-03-23 NOTE — TELEPHONE ENCOUNTER
Called and LVM for patient to return call to discuss appointment on Thursday 3/26/20.    For clinic staff:  -Patient received left knee steroid injection on 3/5/20 referal from Dr. Lyn  -Per Dr. Lyn in telephone encounter from 3/3/20 if injection was not helpful to discuss referral to orthopedic surgeon  -please offer rescheduling with Dr. Lyn for April or Ortho  order    Tyra Loya ATC

## 2020-03-24 NOTE — TELEPHONE ENCOUNTER
Pain not sig improved with steroid injection, knee feels tight.  MRI showing large meniscal tear but also underlying osteoarthrosis.  Given this plan to have pt ACE wrap and cont HEP.  Plan to place Synvisc injection and possible aspiration at that time.  F/U in two weeks.  Pt understands and agrees with plan.    Dimitris Quiroz MD

## 2020-04-05 ENCOUNTER — DOCUMENTATION ONLY (OUTPATIENT)
Dept: ORTHOPEDICS | Facility: CLINIC | Age: 55
End: 2020-04-05

## 2020-04-05 NOTE — PROGRESS NOTES
Can change provider and keep appt this week.  Otherwise I can see her the following week for LEE injection.      Dimitris Quiroz MD

## 2020-04-07 NOTE — PROGRESS NOTES
LVM for patient to return call to discuss rescheduling this appointment.     Suggest scheduling the appointment with Dr Quiroz next week at a day he is in clinic.    Dread Melgar ATC, LAT

## 2020-04-16 ENCOUNTER — OFFICE VISIT (OUTPATIENT)
Dept: ORTHOPEDICS | Facility: CLINIC | Age: 55
End: 2020-04-16
Payer: COMMERCIAL

## 2020-04-16 ENCOUNTER — ANCILLARY PROCEDURE (OUTPATIENT)
Dept: ULTRASOUND IMAGING | Facility: CLINIC | Age: 55
End: 2020-04-16
Attending: FAMILY MEDICINE
Payer: COMMERCIAL

## 2020-04-16 VITALS — HEIGHT: 67 IN | BODY MASS INDEX: 45.99 KG/M2 | WEIGHT: 293 LBS

## 2020-04-16 DIAGNOSIS — M25.562 ACUTE PAIN OF LEFT KNEE: ICD-10-CM

## 2020-04-16 DIAGNOSIS — S83.242D ACUTE MEDIAL MENISCAL TEAR, LEFT, SUBSEQUENT ENCOUNTER: Primary | ICD-10-CM

## 2020-04-16 DIAGNOSIS — M17.12 LOCALIZED OSTEOARTHRITIS OF LEFT KNEE: ICD-10-CM

## 2020-04-16 DIAGNOSIS — M79.89 LEFT LEG SWELLING: ICD-10-CM

## 2020-04-16 PROCEDURE — 93971 EXTREMITY STUDY: CPT | Mod: LT

## 2020-04-16 PROCEDURE — 20611 DRAIN/INJ JOINT/BURSA W/US: CPT | Mod: LT | Performed by: FAMILY MEDICINE

## 2020-04-16 ASSESSMENT — MIFFLIN-ST. JEOR: SCORE: 2251.97

## 2020-04-16 NOTE — LETTER
4/16/2020         RE: Ashli Narvaez  [address]  [facility] [address]        Dear Colleague,    Thank you for referring your patient, Ashli Narvaez, to the [facility]. Please see a copy of my visit note below.    Large Joint Injection/Arthocentesis: L knee joint    Date/Time: 4/16/2020 8:10 AM  Performed by: Dimitris Quiroz MD  Authorized by: Dimitris Quiroz MD     Indications:  Osteoarthritis  Needle Size:  21 G  Guidance: ultrasound    Approach:  Anterolateral  Location:  Knee      Medications:  48 mg hylan 48 MG/6ML  Aspirate amount (mL):  30  Aspirate:  Serous  Outcome:  Tolerated well, no immediate complications  Procedure discussed: discussed risks, benefits, and alternatives    Consent Given by:  Patient  Timeout: timeout called immediately prior to procedure    Prep: patient was prepped and draped in usual sterile fashion     Pt had calf swelling in addition to knee pain.  Likely related to underlying arthritis.  Pt has family HO DVT with sister and mother.  Given this a STAT U/S ordered.  Patient tolerated knee aspiration and hyaluronic acid injection today.  Aftercare instructions given to patient.  HEP given today, will f/u with U/S results.  Otherwise f/u PRN.  Pt understands and agrees with plan.     Dimitris Quiroz MD [facility]              Again, thank you for allowing me to participate in the care of your patient.        Sincerely,        Dimitris Quiroz MD

## 2020-04-16 NOTE — PATIENT INSTRUCTIONS
Diagnosis: Left knee arthritis   Image Findings: mild knee OA  Treatment: Synvisc injection today, STAT ultrasound for possible left DVT  Medications: Limited tylenol/ibuprofen for pain for 1-2 weeks  Follow-up: 9:40 Ultrasound downstairs will call with results    It was great seeing you again today!    Dimitris Quiroz    [facility] [place]    Procedure: Left knee aspiration and Synvisc injection      You may shower, however avoid swimming, tub baths or hot tubs for 24 hours following your procedure    You may have a mild to moderate increase in pain for several days following the injection.    It may take up to 30 days for the medication to start working although you may feel the effect as early as a few days after the procedure.    You may use ice packs for 10-15 minutes, 3 to 4 times a day at the injection site for comfort    You may use anti-inflammatory medications (such as Ibuprofen or Aleve or Advil) or Tylenol for pain control if necessary    If you were fasting, you may resume your normal diet and medications after the procedure        If you experience any of the following, call [facility] @ [phone] or [phone]  -Fever over 100 degree F  -Swelling, bleeding, redness, drainage, warmth at the injection site  - New or worsening pain

## 2020-04-16 NOTE — PROGRESS NOTES
Large Joint Injection/Arthocentesis: L knee joint    Date/Time: 4/16/2020 8:10 AM  Performed by: Dimitris Quiroz MD  Authorized by: Dimitris Quiroz MD     Indications:  Osteoarthritis  Needle Size:  21 G  Guidance: ultrasound    Approach:  Anterolateral  Location:  Knee      Medications:  48 mg hylan 48 MG/6ML  Aspirate amount (mL):  30  Aspirate:  Serous  Outcome:  Tolerated well, no immediate complications  Procedure discussed: discussed risks, benefits, and alternatives    Consent Given by:  Patient  Timeout: timeout called immediately prior to procedure    Prep: patient was prepped and draped in usual sterile fashion     Pt had calf swelling in addition to knee pain.  Likely related to underlying arthritis.  Pt has family HO DVT with sister and mother.  Given this a STAT U/S ordered.  Patient tolerated knee aspiration and hyaluronic acid injection today.  Aftercare instructions given to patient.  HEP given today, will f/u with U/S results.  Otherwise f/u PRN.  Pt understands and agrees with plan.     Dimitris Qiuroz MD [facility]

## 2021-05-03 ENCOUNTER — TELEPHONE (OUTPATIENT)
Dept: ORTHOPEDICS | Facility: CLINIC | Age: 56
End: 2021-05-03
Payer: COMMERCIAL

## 2021-05-03 DIAGNOSIS — M17.12 LOCALIZED OSTEOARTHRITIS OF LEFT KNEE: Primary | ICD-10-CM

## 2021-05-03 NOTE — TELEPHONE ENCOUNTER
Patient scheduled for appointment on 5/11/21 for discussion of viscosupplementation injection vs steroid injection of left knee.      SynviscOne injection last completed 4/16/20.       Prior authorization referral for SynviscOne injection pended.     Please advise.    Tyra Loya, ATC

## 2021-05-11 ENCOUNTER — OFFICE VISIT (OUTPATIENT)
Dept: ORTHOPEDICS | Facility: CLINIC | Age: 56
End: 2021-05-11
Payer: COMMERCIAL

## 2021-05-11 DIAGNOSIS — M17.12 LOCALIZED OSTEOARTHRITIS OF LEFT KNEE: Primary | ICD-10-CM

## 2021-05-11 PROCEDURE — 20611 DRAIN/INJ JOINT/BURSA W/US: CPT | Mod: LT | Performed by: FAMILY MEDICINE

## 2021-05-11 NOTE — LETTER
5/11/2021         RE: Ashli Narvaez  [address]  [facility] [address]        Dear Colleague,    Thank you for referring your patient, Ashli Narvaez, to the [facility]. Please see a copy of my visit note below.    Large Joint Injection/Arthocentesis: L knee joint    Date/Time: 5/11/2021 8:10 AM  Performed by: Dimitris Quiroz MD  Authorized by: Dimitris Quiroz MD     Indications:  Pain and osteoarthritis  Needle Size:  21 G  Guidance: ultrasound    Approach:  Superolateral  Location:  Knee      Medications:  48 mg hylan 48 MG/6ML  Outcome:  Tolerated well, no immediate complications  Procedure discussed: discussed risks, benefits, and alternatives    Consent Given by:  Patient  Timeout: timeout called immediately prior to procedure    Prep: patient was prepped and draped in usual sterile fashion     Patient tolerated left knee hyaluronic acid injection today.  Aftercare instructions given to patient.  Plan to follow-up as previously discussed with referring provider.     Dimitris Quiroz MD [facility]                Again, thank you for allowing me to participate in the care of your patient.        Sincerely,        Dimitris Quiroz MD

## 2021-05-11 NOTE — PROGRESS NOTES
Large Joint Injection/Arthocentesis: L knee joint    Date/Time: 5/11/2021 8:10 AM  Performed by: Dimitris Quiroz MD  Authorized by: Dimitris Quiroz MD     Indications:  Pain and osteoarthritis  Needle Size:  21 G  Guidance: ultrasound    Approach:  Superolateral  Location:  Knee      Medications:  48 mg hylan 48 MG/6ML  Outcome:  Tolerated well, no immediate complications  Procedure discussed: discussed risks, benefits, and alternatives    Consent Given by:  Patient  Timeout: timeout called immediately prior to procedure    Prep: patient was prepped and draped in usual sterile fashion     Patient tolerated left knee hyaluronic acid injection today.  Aftercare instructions given to patient.  Plan to follow-up as previously discussed with referring provider.     Dimitris Quiroz MD [facility]

## 2021-05-11 NOTE — PATIENT INSTRUCTIONS
[facility] [place]jection Discharge Instructions    Procedure: Left knee Synvisc injection      You may shower, however avoid swimming, tub baths or hot tubs for 24 hours following your procedure    You may have a mild to moderate increase in pain for several days following the injection.    It may take up to 14 days for the medication to start working although you may feel the effect as early as a few days after the procedure.    You may use ice packs for 10-15 minutes, 3 to 4 times a day at the injection site for comfort    You may use anti-inflammatory medications (such as Ibuprofen or Aleve or Advil) or Tylenol for pain control if necessary    If you were fasting, you may resume your normal diet and medications after the procedure      If you experience any of the following, call [facility] @ [phone] or [phone]  -Fever over 100 degree F  -Swelling, bleeding, redness, drainage, warmth at the injection site  - New or worsening pain    It was great seeing you again today!    Dimitris Quiroz

## 2022-03-01 ENCOUNTER — LAB REQUISITION (OUTPATIENT)
Dept: LAB | Facility: CLINIC | Age: 57
End: 2022-03-01

## 2022-03-01 PROCEDURE — U0005 INFEC AGEN DETEC AMPLI PROBE: HCPCS | Performed by: INTERNAL MEDICINE

## 2022-03-02 LAB — SARS-COV-2 RNA RESP QL NAA+PROBE: NEGATIVE

## 2022-03-08 ENCOUNTER — LAB REQUISITION (OUTPATIENT)
Dept: LAB | Facility: CLINIC | Age: 57
End: 2022-03-08

## 2022-03-08 PROCEDURE — U0005 INFEC AGEN DETEC AMPLI PROBE: HCPCS | Performed by: INTERNAL MEDICINE

## 2022-03-09 LAB — SARS-COV-2 RNA RESP QL NAA+PROBE: NEGATIVE

## 2022-03-15 ENCOUNTER — LAB REQUISITION (OUTPATIENT)
Dept: LAB | Facility: CLINIC | Age: 57
End: 2022-03-15

## 2022-03-15 PROCEDURE — U0003 INFECTIOUS AGENT DETECTION BY NUCLEIC ACID (DNA OR RNA); SEVERE ACUTE RESPIRATORY SYNDROME CORONAVIRUS 2 (SARS-COV-2) (CORONAVIRUS DISEASE [COVID-19]), AMPLIFIED PROBE TECHNIQUE, MAKING USE OF HIGH THROUGHPUT TECHNOLOGIES AS DESCRIBED BY CMS-2020-01-R: HCPCS | Performed by: INTERNAL MEDICINE

## 2022-03-16 LAB — SARS-COV-2 RNA RESP QL NAA+PROBE: NEGATIVE

## 2022-03-22 ENCOUNTER — LAB REQUISITION (OUTPATIENT)
Dept: LAB | Facility: CLINIC | Age: 57
End: 2022-03-22

## 2022-03-22 PROCEDURE — U0005 INFEC AGEN DETEC AMPLI PROBE: HCPCS | Performed by: INTERNAL MEDICINE

## 2022-03-23 LAB — SARS-COV-2 RNA RESP QL NAA+PROBE: NEGATIVE

## 2022-03-29 ENCOUNTER — LAB REQUISITION (OUTPATIENT)
Dept: LAB | Facility: CLINIC | Age: 57
End: 2022-03-29

## 2022-03-29 PROCEDURE — U0003 INFECTIOUS AGENT DETECTION BY NUCLEIC ACID (DNA OR RNA); SEVERE ACUTE RESPIRATORY SYNDROME CORONAVIRUS 2 (SARS-COV-2) (CORONAVIRUS DISEASE [COVID-19]), AMPLIFIED PROBE TECHNIQUE, MAKING USE OF HIGH THROUGHPUT TECHNOLOGIES AS DESCRIBED BY CMS-2020-01-R: HCPCS | Performed by: INTERNAL MEDICINE

## 2022-03-30 LAB — SARS-COV-2 RNA RESP QL NAA+PROBE: NEGATIVE

## 2022-04-01 ENCOUNTER — OFFICE VISIT (OUTPATIENT)
Dept: FAMILY MEDICINE | Facility: CLINIC | Age: 57
End: 2022-04-01
Payer: COMMERCIAL

## 2022-04-01 VITALS
HEIGHT: 67 IN | HEART RATE: 66 BPM | DIASTOLIC BLOOD PRESSURE: 72 MMHG | WEIGHT: 293 LBS | SYSTOLIC BLOOD PRESSURE: 189 MMHG | BODY MASS INDEX: 45.99 KG/M2 | OXYGEN SATURATION: 98 % | RESPIRATION RATE: 20 BRPM | TEMPERATURE: 98.6 F

## 2022-04-01 DIAGNOSIS — D48.5 NEOPLASM OF UNCERTAIN BEHAVIOR OF SKIN: ICD-10-CM

## 2022-04-01 DIAGNOSIS — Z12.11 SCREEN FOR COLON CANCER: ICD-10-CM

## 2022-04-01 DIAGNOSIS — L98.9 SKIN LESION: Primary | ICD-10-CM

## 2022-04-01 DIAGNOSIS — Z12.31 VISIT FOR SCREENING MAMMOGRAM: ICD-10-CM

## 2022-04-01 DIAGNOSIS — E66.01 MORBID OBESITY (H): ICD-10-CM

## 2022-04-01 PROCEDURE — 99203 OFFICE O/P NEW LOW 30 MIN: CPT | Performed by: STUDENT IN AN ORGANIZED HEALTH CARE EDUCATION/TRAINING PROGRAM

## 2022-04-01 ASSESSMENT — PAIN SCALES - GENERAL: PAINLEVEL: NO PAIN (0)

## 2022-04-01 NOTE — PROGRESS NOTES
"  Assessment & Plan     1. Skin lesion    - Adult Dermatology Referral; Future    2. Neoplasm of uncertain behavior of skin    - Adult Dermatology Referral; Future    3. Visit for screening mammogram  - MA SCREENING DIGITAL BILAT - Future  (s+30); Future    4. Screen for colon cancer    - DYLLAN([employer])    5. Morbid obesity (H)  Counseled diet and exercise    6}     BMI:   Estimated body mass index is 53.6 kg/m  as calculated from the following:    Height as of this encounter: 1.702 m (5' 7\").    Weight as of this encounter: 155.2 kg (342 lb 4 oz).   Weight management plan: Discussed healthy diet and exercise guidelines        Return for Follow up, Routine preventive.    Breanne Pretty MD  [facility] CRISTINO Cornelius is a 57 year old who presents for the following health issues   History of Present Illness       Reason for visit:  Growth on head  Symptom onset:  More than a month  Symptoms include:  Growth on head  Symptom intensity:  Mild  Symptom progression:  Worsening (size changes )  Had these symptoms before:  No    She eats 0-1 servings of fruits and vegetables daily.She consumes 0 sweetened beverage(s) daily.She exercises with enough effort to increase her heart rate 9 or less minutes per day.  She exercises with enough effort to increase her heart rate 3 or less days per week.   She is taking medications regularly.     Growth was first noticed 7 months ago but has become progressively enlarged in the last 2 months.  Review of Systems   Constitutional, HEENT, cardiovascular, pulmonary, gi and gu systems are negative, except as otherwise noted.      Objective    BP (!) 160/74   Pulse 66   Temp 98.6  F (37  C) (Tympanic)   Resp 20   Ht 1.702 m (5' 7\")   Wt (!) 155.2 kg (342 lb 4 oz)   LMP 01/24/2022   SpO2 98%   Breastfeeding No   BMI 53.60 kg/m    Body mass index is 53.6 kg/m .  Physical Exam   GENERAL: healthy, alert and no distress  RESP: lungs clear " to auscultation - no rales, rhonchi or wheezes  CV: regular rate and rhythm, normal S1 S2, no S3 or S4,   MS: no gross musculoskeletal defects noted, no edema  SKIN: vascularized raised flesh colored lesion in the right parietal region   NEURO:  speech normal  PSYCH: mentation appears normal, affect normal/bright

## 2022-04-01 NOTE — PATIENT INSTRUCTIONS
Ayaz Cornelius,    Thank you for allowing [facility] to manage your care.      I ordered a mammogram , please call diagnostic imaging [phone] to schedule your test.    I made a referral, they will be calling in approximately 1 week to set up your appointment.  If you do not hear from them, please call the specialty number on your after visit.     For your convenience, test results are released as soon as they are available  Please allow 1-2 business days for me to send you a comment about your results.  If not done so, I encourage you to login into [employer] ([email or web address] to review your results in real time.     If you have any questions or concerns, please feel free to call us at [phone].    Sincerely,    Dr. Pretty    Did you know?      You can schedule a video visit for follow-up appointments as well as future appointments for certain conditions.  Please see the below link.     [email or web address]    If you have not already done so,  I encourage you to sign up for Doc[employer] ([email or web address]).  This will allow you to review your results, securely communicate with a provider, and schedule virtual visits as well.

## 2022-04-13 ENCOUNTER — OFFICE VISIT (OUTPATIENT)
Dept: DERMATOLOGY | Facility: CLINIC | Age: 57
End: 2022-04-13
Attending: STUDENT IN AN ORGANIZED HEALTH CARE EDUCATION/TRAINING PROGRAM
Payer: COMMERCIAL

## 2022-04-13 VITALS — SYSTOLIC BLOOD PRESSURE: 138 MMHG | DIASTOLIC BLOOD PRESSURE: 84 MMHG | OXYGEN SATURATION: 97 % | HEART RATE: 49 BPM

## 2022-04-13 DIAGNOSIS — D48.5 NEOPLASM OF UNCERTAIN BEHAVIOR OF SKIN: Primary | ICD-10-CM

## 2022-04-13 PROCEDURE — 11102 TANGNTL BX SKIN SINGLE LES: CPT | Performed by: PHYSICIAN ASSISTANT

## 2022-04-13 PROCEDURE — 88305 TISSUE EXAM BY PATHOLOGIST: CPT | Performed by: DERMATOLOGY

## 2022-04-13 NOTE — PATIENT INSTRUCTIONS
Proper skin care from [facility]:    -Eliminate harsh soaps as they strip the natural oils from the skin, often resulting in dry itchy skin ( i.e. Dial, Zest, Stacie Spring)  -Use mild soaps such as Cetaphil or Dove Sensitive Skin in the shower. You do not need to use soap on arms, legs, and trunk every time you shower unless visibly soiled.   -Avoid hot or cold showers.  -After showering, lightly dry off and apply moisturizing within 2-3 minutes. This will help trap moisture in the skin.   -Aggressive use of a moisturizer at least 1-2 times a day to the entire body (including -Vanicream, Cetaphil, Aquaphor or Cerave) and moisturize hands after every washing.  -We recommend using moisturizers that come in a tub that needs to be scooped out, not a pump. This has more of an oil base. It will hold moisture in your skin much better than a water base moisturizer. The above recommended are non-pore clogging.      Wear a sunscreen with at least SPF 30 on your face, ears, neck and V of the chest daily. Wear sunscreen on other areas of the body if those areas are exposed to the sun throughout the day. Sunscreens can contain physical and/or chemical blockers. Physical blockers are less likely to clog pores, these include zinc oxide and titanium dioxide. Reapply every two hour and after swimming.     Sunscreen examples: https://www.ewg.org/sunscreen/    UV radiation  UVA radiation remains constant throughout the day and throughout the year. It is a longer wavelength than UVB and therefore penetrates deeper into the skin leading to immediate and delayed tanning, photoaging, and skin cancer. 70-80% of UVA and UVB radiation occurs between the hours of 10am-2pm.  UVB radiation  UVB radiation causes the most harmful effects and is more significant during the summer months. However, snow and ice can reflect UVB radiation leading to skin damage during the winter months as well. UVB radiation is responsible for tanning,  burning, inflammation, delayed erythema (pinkness), pigmentation (brown spots), and skin cancer.     I recommend self monthly full body exams and yearly full body exams with a dermatology provider. If you develop a new or changing lesion please follow up for examination. Most skin cancers are pink and scaly or pink and pearly. However, we do see blue/brown/black skin cancers.  Consider the ABCDEs of melanoma when giving yourself your monthly full body exam ( don't forget the groin, buttocks, feet, toes, etc). A-asymmetry, B-borders, C-color, D-diameter, E-elevation or evolving. If you see any of these changes please follow up in clinic. If you cannot see your back I recommend purchasing a hand held mirror to use with a larger wall mirror.                    Wound Care Instructions     FOR SUPERFICIAL WOUNDS     [facility] or     [facility] [phone]          AFTER 24 HOURS YOU SHOULD REMOVE THE BANDAGE AND BEGIN DAILY DRESSING CHANGES AS FOLLOWS:     1) Remove Dressing.     2) Clean and dry the area with tap water using a Q-tip or sterile gauze pad.     3) Apply Vaseline, Aquaphor, Polysporin ointment or Bacitracin ointment over entire wound.  Do NOT use Neosporin ointment.     4) Cover the wound with a band-aid, or a sterile non-stick gauze pad and micropore paper tape      REPEAT THESE INSTRUCTIONS AT LEAST ONCE A DAY UNTIL THE WOUND HAS COMPLETELY HEALED.    It is an old wives tale that a wound heals better when it is exposed to air and allowed to dry out. The wound will heal faster with a better cosmetic result if it is kept moist with ointment and covered with a bandage.    **Do not let the wound dry out.**      Supplies Needed:      *Cotton tipped applicators (Q-tips)    *Polysporin Ointment or Bacitracin Ointment (NOT NEOSPORIN)    *Band-aids or non-stick gauze pads and micropore paper tape.      PATIENT INFORMATION:    During the healing process you will notice a number of  changes. All wounds develop a small halo of redness surrounding the wound.  This means healing is occurring. Severe itching with extensive redness usually indicates sensitivity to the ointment or bandage tape used to dress the wound.  You should call our office if this develops.      Swelling  and/or discoloration around your surgical site is common, particularly when performed around the eye.    All wounds normally drain.  The larger the wound the more drainage there will be.  After 7-10 days, you will notice the wound beginning to shrink and new skin will begin to grow.  The wound is healed when you can see skin has formed over the entire area.  A healed wound has a healthy, shiny look to the surface and is red to dark pink in color to normalize.  Wounds may take approximately 4-6 weeks to heal.  Larger wounds may take 6-8 weeks.  After the wound is healed you may discontinue dressing changes.    You may experience a sensation of tightness as your wound heals. This is normal and will gradually subside.    Your healed wound may be sensitive to temperature changes. This sensitivity improves with time, but if you re having a lot of discomfort, try to avoid temperature extremes.    Patients frequently experience itching after their wound appears to have healed because of the continue healing under the skin.  Plain Vaseline will help relieve the itching.        POSSIBLE COMPLICATIONS    BLEEDING:    Leave the bandage in place.  Use tightly rolled up gauze or a cloth to apply direct pressure over the bandage for 30  minutes.  Reapply pressure for an additional 30 minutes if necessary  Use additional gauze and tape to maintain pressure once the bleeding has stopped.

## 2022-04-13 NOTE — LETTER
4/13/2022         RE: Ashli Narvaez  [address] [place] [address]        Dear Colleague,    Thank you for referring your patient, Ashli Narvaez, to the [facility]. Please see a copy of my visit note below.    HPI:  I was asked to see pt by Dr. Pretty. Ashli Narvaez is a 57 year old female patient here today for growth on right scalp .  Patient states this has been present for a few months.  Patient reports the following symptoms: growing rapidly, bleeding .  Patient reports the following previous treatments: none.  Patient reports the following modifying factors: none.  Associated symptoms: none.  Patient has no other skin complaints today.  Remainder of the HPI, Meds, PMH, Allergies, FH, and SH was reviewed in chart.    Pertinent Hx:   No personal or family history of skin cancer    Past Medical History:   Diagnosis Date     Depressive disorder        Past Surgical History:   Procedure Laterality Date     NO HISTORY OF SURGERY          Family History   Problem Relation Age of Onset     Pulmonary Embolism Mother 50     Osteoarthritis Father      Lung Cancer Brother      Skin Cancer No family hx of        Social History     Socioeconomic History     Marital status:      Spouse name: Not on file     Number of children: Not on file     Years of education: Not on file     Highest education level: Not on file   Occupational History     Not on file   Tobacco Use     Smoking status: Never Smoker     Smokeless tobacco: Never Used   Substance and Sexual Activity     Alcohol use: Never     Drug use: Never     Sexual activity: Not on file   Other Topics Concern     Not on file   Social History Narrative     Not on file     Social Determinants of Health     Financial Resource Strain: Not on file   Food Insecurity: Not on file   Transportation Needs: Not on file   Physical Activity: Not on file   Stress: Not on file   Social Connections: Not on file   Intimate  Partner Violence: Not on file   Housing Stability: Not on file       Outpatient Encounter Medications as of 4/13/2022   Medication Sig Dispense Refill     diclofenac (VOLTAREN) 75 MG EC tablet Take 1 tablet (75 mg) by mouth 2 times daily as needed for moderate pain 60 tablet 0     ferrous sulfate (IRON) 325 (65 Fe) MG tablet Take 1 tablet (325 mg) by mouth daily (with breakfast) (Patient not taking: Reported on 4/13/2022) 90 tablet 2     ibuprofen 200 MG capsule Take 800 mg by mouth as needed (Patient not taking: No sig reported)       norethindrone (MICRONOR) 0.35 MG per tablet Take 1 tablet by mouth daily (Patient not taking: Reported on 4/13/2022)       Facility-Administered Encounter Medications as of 4/13/2022   Medication Dose Route Frequency Provider Last Rate Last Admin     hylan (SYNVISC ONE) injection 48 mg  48 mg   Dimitris Quiroz MD   48 mg at 05/11/21 0810     hylan (SYNVISC ONE) injection 48 mg  48 mg   Dimitris Quiroz MD   48 mg at 04/16/20 0810     ropivacaine (NAROPIN) injection 3 mL  3 mL   Dimitris Quiroz MD   3 mL at 03/05/20 0915       Review Of Systems:  Skin: spot  Eyes: negative  Ears/Nose/Throat: negative  Respiratory: No shortness of breath, dyspnea on exertion, cough, or hemoptysis  Cardiovascular: negative  Gastrointestinal: negative  Genitourinary: negative  Musculoskeletal: negative  Neurologic: negative  Psychiatric: negative  Hematologic/Lymphatic/Immunologic: negative  Endocrine: negative      Objective:     /84   Pulse (!) 49   SpO2 97%   Eyes: Conjunctivae/lids: Normal   ENT: Lips:  Normal  MSK: Normal  Cardiovascular: Peripheral edema none  Pulm: Breathing Normal  Neuro/Psych: Orientation: A/O x 3. 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  No complications and routine wound care.  May grow back and will get a scar. Based on lesion type may need to completely remove lesion. Patient will be notified in 7-10 days of results. Wound care directions given.  Reviewed pertinent charts and labs prior to office visit.   Proper skin care from [facility]:    -Eliminate harsh soaps as they strip the natural oils from the skin, often resulting in dry itchy skin ( i.e. Dial, Zest, Stacie Spring)  -Use mild soaps such as Cetaphil or Dove Sensitive Skin in the shower. You do not need to use soap on arms, legs, and trunk every time you shower unless visibly soiled.   -Avoid hot or cold showers.  -After showering, lightly dry off and apply moisturizing within 2-3 minutes. This will help trap moisture in the skin.   -Aggressive use of a moisturizer at least 1-2 times a day to the entire body (including -Vanicream, Cetaphil, Aquaphor or Cerave) and moisturize hands after every washing.  -We recommend using moisturizers that come in a tub that needs to be scooped out, not a pump. This has more of an oil base. It will hold moisture in your skin much better than a water base moisturizer. The above recommended are non-pore clogging.         It was a pleasure speaking with Ashli Narvaez today.       Follow up in pending pathology        Again, thank you for allowing me to participate in the care of your patient.        Sincerely,        Pretty Garcia PA-C

## 2022-04-13 NOTE — PROGRESS NOTES
HPI:  I was asked to see pt by Dr. Pretty. Ashli Narvaez is a 57 year old female patient here today for growth on right scalp .  Patient states this has been present for a few months.  Patient reports the following symptoms: growing rapidly, bleeding .  Patient reports the following previous treatments: none.  Patient reports the following modifying factors: none.  Associated symptoms: none.  Patient has no other skin complaints today.  Remainder of the HPI, Meds, PMH, Allergies, FH, and SH was reviewed in chart.    Pertinent Hx:   No personal or family history of skin cancer    Past Medical History:   Diagnosis Date     Depressive disorder        Past Surgical History:   Procedure Laterality Date     NO HISTORY OF SURGERY          Family History   Problem Relation Age of Onset     Pulmonary Embolism Mother 50     Osteoarthritis Father      Lung Cancer Brother      Skin Cancer No family hx of        Social History     Socioeconomic History     Marital status:      Spouse name: Not on file     Number of children: Not on file     Years of education: Not on file     Highest education level: Not on file   Occupational History     Not on file   Tobacco Use     Smoking status: Never Smoker     Smokeless tobacco: Never Used   Substance and Sexual Activity     Alcohol use: Never     Drug use: Never     Sexual activity: Not on file   Other Topics Concern     Not on file   Social History Narrative     Not on file     Social Determinants of Health     Financial Resource Strain: Not on file   Food Insecurity: Not on file   Transportation Needs: Not on file   Physical Activity: Not on file   Stress: Not on file   Social Connections: Not on file   Intimate Partner Violence: Not on file   Housing Stability: Not on file       Outpatient Encounter Medications as of 4/13/2022   Medication Sig Dispense Refill     diclofenac (VOLTAREN) 75 MG EC tablet Take 1 tablet (75 mg) by mouth 2 times daily as needed for  moderate pain 60 tablet 0     ferrous sulfate (IRON) 325 (65 Fe) MG tablet Take 1 tablet (325 mg) by mouth daily (with breakfast) (Patient not taking: Reported on 4/13/2022) 90 tablet 2     ibuprofen 200 MG capsule Take 800 mg by mouth as needed (Patient not taking: No sig reported)       norethindrone (MICRONOR) 0.35 MG per tablet Take 1 tablet by mouth daily (Patient not taking: Reported on 4/13/2022)       Facility-Administered Encounter Medications as of 4/13/2022   Medication Dose Route Frequency Provider Last Rate Last Admin     hylan (SYNVISC ONE) injection 48 mg  48 mg   Dimitris Quiroz MD   48 mg at 05/11/21 0810     hylan (SYNVISC ONE) injection 48 mg  48 mg   Dimitris Quiroz MD   48 mg at 04/16/20 0810     ropivacaine (NAROPIN) injection 3 mL  3 mL   Dimitris Quiroz MD   3 mL at 03/05/20 0915       Review Of Systems:  Skin: spot  Eyes: negative  Ears/Nose/Throat: negative  Respiratory: No shortness of breath, dyspnea on exertion, cough, or hemoptysis  Cardiovascular: negative  Gastrointestinal: negative  Genitourinary: negative  Musculoskeletal: negative  Neurologic: negative  Psychiatric: negative  Hematologic/Lymphatic/Immunologic: negative  Endocrine: negative      Objective:     /84   Pulse (!) 49   SpO2 97%   Eyes: Conjunctivae/lids: Normal   ENT: Lips:  Normal  MSK: Normal  Cardiovascular: Peripheral edema none  Pulm: Breathing Normal  Neuro/Psych: Orientation: A/O x 3. Normal; Mood/Affect: Normal, NAD, WDWN  Pt accompanied by: self  Following areas examined: face,neck, right parietal scalp  Paige skin type:ii   Findings:  Red lobular nodule on right post auricular scalp 1.6cm  Assessment and Plan:     1)Neoplasm of uncertain behavior right post auricular scalp 1.6cm  Rule out hemangioma  TANGENTIAL BIOPSY:  After consent, anesthesia with LEC and prep, tangential biopsy performed.  No complications and routine wound care.  May grow back and will get a scar. Based on lesion  type may need to completely remove lesion. Patient will be notified in 7-10 days of results. Wound care directions given.  Reviewed pertinent charts and labs prior to office visit.   Proper skin care from [facility]:    -Eliminate harsh soaps as they strip the natural oils from the skin, often resulting in dry itchy skin ( i.e. Dial, Zest, Stacie Spring)  -Use mild soaps such as Cetaphil or Dove Sensitive Skin in the shower. You do not need to use soap on arms, legs, and trunk every time you shower unless visibly soiled.   -Avoid hot or cold showers.  -After showering, lightly dry off and apply moisturizing within 2-3 minutes. This will help trap moisture in the skin.   -Aggressive use of a moisturizer at least 1-2 times a day to the entire body (including -Vanicream, Cetaphil, Aquaphor or Cerave) and moisturize hands after every washing.  -We recommend using moisturizers that come in a tub that needs to be scooped out, not a pump. This has more of an oil base. It will hold moisture in your skin much better than a water base moisturizer. The above recommended are non-pore clogging.         It was a pleasure speaking with Ashli Narvaez today.       Follow up in pending pathology

## 2022-04-18 LAB
PATH REPORT.COMMENTS IMP SPEC: NORMAL
PATH REPORT.COMMENTS IMP SPEC: NORMAL
PATH REPORT.FINAL DX SPEC: NORMAL
PATH REPORT.GROSS SPEC: NORMAL
PATH REPORT.MICROSCOPIC SPEC OTHER STN: NORMAL
PATH REPORT.RELEVANT HX SPEC: NORMAL

## 2022-05-20 ENCOUNTER — PATIENT OUTREACH (OUTPATIENT)
Dept: FAMILY MEDICINE | Facility: CLINIC | Age: 57
End: 2022-05-20

## 2022-05-20 NOTE — TELEPHONE ENCOUNTER
Patient Quality Outreach    Patient is due for the following:   Colon Cancer Screening -  Colonoscopy  Breast Cancer Screening - Mammogram  Immunizations  -  Covid and Zoster    NEXT STEPS:   Schedule a yearly physical    Type of outreach:    Sent [employer] message.    Next Steps:  Reach out within 90 days via Letter.    Max number of attempts reached: No. Will try again in 90 days if patient still on fail list.      LXIONG3, MEDICAL ASSISTANT

## 2022-05-20 NOTE — LETTER
July 6, 2022      Ashli AILEEN Solange  [address] [place] [address]  [place] [address]      Your healthcare team cares about your health. To provide you with the best care,   we have reviewed your chart and based on our findings, we see that you are due to:     - BREAST CANCER SCREENING:  Schedule Annual Mammogram. Breast center scheduling number - [phone] or schedule in MyChart (self referall)  - CHLAMYDIA SCREENING:  Schedule a routine office visit with chlamydia screening which is annual recommended for sexually active women between the ages of 15 and 25.  - OTHER FOLLOW UP:  ANNUAL PHYSICAL EXAM AND IMMUNIZATIONS UPDATE.    If you have already completed these items, please contact the clinic via phone or   "[employer]hart so your care team can review and update your records. Thank you for   choosing [facility] for your healthcare needs. For any questions,   concerns, or to schedule an appointment please contact the clinic.       Healthy Regards,      Your [facility] Care Team            Sincerely,        Breanne Pretty MD

## 2022-05-21 ENCOUNTER — HEALTH MAINTENANCE LETTER (OUTPATIENT)
Age: 57
End: 2022-05-21

## 2022-07-06 NOTE — TELEPHONE ENCOUNTER
Patient Quality Outreach    Patient is due for the following:   Colon Cancer Screening -  Colonoscopy  Breast Cancer Screening - Mammogram  Immunizations  -  Covid and Zoster    NEXT STEPS:   Schedule a yearly physical    Type of outreach:    Sent letter.      Max number of attempts reached: Yes. Will try again in 90 days if patient still on fail list.    Closing Encounter.     LXIONG3, MEDICAL ASSISTANT

## 2022-09-17 ENCOUNTER — TELEPHONE (OUTPATIENT)
Dept: ORTHOPEDICS | Facility: CLINIC | Age: 57
End: 2022-09-17
Payer: COMMERCIAL

## 2022-09-17 DIAGNOSIS — M17.12 LOCALIZED OSTEOARTHRITIS OF LEFT KNEE: Primary | ICD-10-CM

## 2022-09-17 NOTE — TELEPHONE ENCOUNTER
Patient scheduled for appointment on 9/29/2022 for discussion of viscosupplementation injection vs steroid injection of left knee.      SynviscOne injection last completed 5/11/2021.       Prior authorization referral for SynviscOne injection pended.     Please advise.

## 2022-09-18 ENCOUNTER — HEALTH MAINTENANCE LETTER (OUTPATIENT)
Age: 57
End: 2022-09-18

## 2022-09-29 ENCOUNTER — ANCILLARY PROCEDURE (OUTPATIENT)
Dept: GENERAL RADIOLOGY | Facility: CLINIC | Age: 57
End: 2022-09-29
Attending: FAMILY MEDICINE
Payer: COMMERCIAL

## 2022-09-29 ENCOUNTER — OFFICE VISIT (OUTPATIENT)
Dept: ORTHOPEDICS | Facility: CLINIC | Age: 57
End: 2022-09-29
Payer: COMMERCIAL

## 2022-09-29 VITALS
HEIGHT: 67 IN | WEIGHT: 293 LBS | SYSTOLIC BLOOD PRESSURE: 124 MMHG | BODY MASS INDEX: 45.99 KG/M2 | DIASTOLIC BLOOD PRESSURE: 72 MMHG

## 2022-09-29 DIAGNOSIS — M25.561 RIGHT KNEE PAIN: ICD-10-CM

## 2022-09-29 DIAGNOSIS — M25.561 ACUTE PAIN OF RIGHT KNEE: Primary | ICD-10-CM

## 2022-09-29 PROCEDURE — 73562 X-RAY EXAM OF KNEE 3: CPT | Mod: TC | Performed by: RADIOLOGY

## 2022-09-29 PROCEDURE — 99213 OFFICE O/P EST LOW 20 MIN: CPT | Performed by: FAMILY MEDICINE

## 2022-09-29 NOTE — LETTER
9/29/2022         RE: Ashli Narvaez  [address]  [place] [address]        Dear Colleague,    Thank you for referring your patient, Ashli Narvaez, to the [facility] [place]. Please see a copy of my visit note below.    ASSESSMENT & PLAN      Ashli was seen today for follow up.    Diagnoses and all orders for this visit:    Acute pain of right knee  -     XR Knee Standing AP [place] Bilat Lat Right; Future  -     MR Knee Right w/o Contrast; Future    # Right Knee Pain: Symptoms notable over the past month with worsening medial knee pain. She does have tenderness to palpation over the medial joint line with pain with walking. X-rays today showing concern for medial femoral condyle insufficiency fracture not seen on previous x-rays. Given this will plan to unload, get MRI and follow-up either in clinic or via video visit to go over the results. If positive plan to order medial  knee brace. Otherwise plan is below     Image Findings: concern today for medial femoral condyle insufficiency fracture on the right side  Treatment: Activities as tolerated, unload right leg either with crutches or with a cane, would offload to the point of being pain free, right knee MRI ordered  Job: as tolerated  Medications/Injections: Limited tylenol/ibuprofen for pain for 1-2 weeks, defer today  Follow-up: after MRI to go over the results and consideration of steroid injection if no insufficiency fracture is noted    Ashli with R. Knee pain, with an insidious onset and progressively worsening. Has X-Ray with possible insufficiency fracture not seen in prior imaging in the medial femoral condyle. Discussed with her to plan on further imaging to have better clarification of the site and off load the knee in the meantime to help relieve pressure.   This issue is chronic and is worsening.    Plan:  Further Testing: Plan for MRI of the R. Knee.   Patient to off load right leg, can use cane  "but would benefit from crutches.  RTC: Follow-up in person visit in clinic or via video visit in 1 month with Dr. Quiroz.    Sandra Mcmahan MD  Resident Physician PGY-2  Physical Medicine and Rehabilitation.    Dimitris Quiroz MD  [facility] [place]    -----  Chief Complaint   Patient presents with     Left Knee - Follow Up       SUBJECTIVE  Ashli Narvaez is a/an 57 year old female who is seen as a self referral for evaluation of right knee pain.     She notes she has no pain with seating, but walking and getting up pain will start increasing in intensity to 5-6/10. This limits her walking distance. She has been able to walk about a 1.5 blocks before pain becomes limiting. She notes its more localized to the medial knee. She has note some swelling in the knee as well. She is hoping to get some relief with joint injection this morning.  The patient is seen by themselves.  The patient is Right handed    Onset: 1 month(s) ago. Reports insidious onset without acute precipitating event.  Location of Pain: right medial knee pain   Worsened by: walking, going up stairs, lying in bed  Better with: ice  Treatments tried: ice and ibuprofen  Associated symptoms: swelling and cracking     Orthopedic/Surgical history: YES -chronic left knee pain   Social History/Occupation:      No family history pertinent to patient's problem today.     REVIEW OF SYSTEMS:  Review of Systems  Constitutional, HEENT, cardiovascular, pulmonary, gi and gu systems are negative, except as otherwise noted.    OBJECTIVE:  /72   Ht 1.702 m (5' 7\")   Wt (!) 157.4 kg (347 lb)   BMI 54.35 kg/m     General: healthy, alert and in no distress  HEENT: no scleral icterus or conjunctival erythema  Skin: no suspicious lesions or rash. No jaundice.  CV: distal perfusion intact   Resp: normal respiratory effort without conversational dyspnea   Psych: normal mood and affect  Gait: normal steady gait with " appropriate coordination and balance   Neuro/MSK: Normal light sensory exam of bilateral lower extremity   Motor: 5/5 with bilateral hip flexion, 5/5 in left knee extension, mild swelling in the right R. Knee, 4+/5 power in the R. knee limited by pain, + pain on palpation of the R. medial knee joint line, R. knee Valgus stress test is positive, unable to extend to 0 degree, able to do 90 degree on flexion. Normal bilateral ankle dorsiflexion, EHL, or plantar flexion.   Reflexes: intact, 2+ and symmetric in bilateral upper and lower extremities with biceps, triceps, BR, patellar and achilles.  Back Exam: Deferred       RADIOLOGY:  I reviewed ordered, visualized and reviewed these images with the patient and Dr. Quiroz    9/29/2022 X-Rays Knee Standing.   Impression: Self  Possible R. Insufficiency fracture in the medial femoral condyle  not present in prior imaging on 01/29/2020        Review of external notes as documented elsewhere in note  Review of the result(s) of each unique test - right knee x-rays       Disclaimer: This note consists of symbols derived from keyboarding, dictation and/or voice recognition software. As a result, there may be errors in the script that have gone undetected. Please consider this when interpreting information found in this chart.          Again, thank you for allowing me to participate in the care of your patient.        Sincerely,        Dimitris Quiroz MD

## 2022-09-29 NOTE — PROGRESS NOTES
ASSESSMENT & PLAN      Ashli was seen today for follow up.    Diagnoses and all orders for this visit:    Acute pain of right knee  -     XR Knee Standing AP [place] Bilat Lat Right; Future  -     MR Knee Right w/o Contrast; Future    # Right Knee Pain: Symptoms notable over the past month with worsening medial knee pain. She does have tenderness to palpation over the medial joint line with pain with walking. X-rays today showing concern for medial femoral condyle insufficiency fracture not seen on previous x-rays. Given this will plan to unload, get MRI and follow-up either in clinic or via video visit to go over the results. If positive plan to order medial  knee brace. Otherwise plan is below     Image Findings: concern today for medial femoral condyle insufficiency fracture on the right side  Treatment: Activities as tolerated, unload right leg either with crutches or with a cane, would offload to the point of being pain free, right knee MRI ordered  Job: as tolerated  Medications/Injections: Limited tylenol/ibuprofen for pain for 1-2 weeks, defer today  Follow-up: after MRI to go over the results and consideration of steroid injection if no insufficiency fracture is noted    Ashli with R. Knee pain, with an insidious onset and progressively worsening. Has X-Ray with possible insufficiency fracture not seen in prior imaging in the medial femoral condyle. Discussed with her to plan on further imaging to have better clarification of the site and off load the knee in the meantime to help relieve pressure.   This issue is chronic and is worsening.    Plan:  Further Testing: Plan for MRI of the R. Knee.   Patient to off load right leg, can use cane but would benefit from crutches.  RTC: Follow-up in person visit in clinic or via video visit in 1 month with Dr. Quiroz.    Sandra Mcmahan MD  Resident Physician PGY-2  Physical Medicine and Rehabilitation.    Dimitris Quiroz MD  [facility]  "[facility] [place]    -----  Chief Complaint   Patient presents with     Left Knee - Follow Up       SUBJECTIVE  Ashli Narvaez is a/an 57 year old female who is seen as a self referral for evaluation of right knee pain.     She notes she has no pain with seating, but walking and getting up pain will start increasing in intensity to 5-6/10. This limits her walking distance. She has been able to walk about a 1.5 blocks before pain becomes limiting. She notes its more localized to the medial knee. She has note some swelling in the knee as well. She is hoping to get some relief with joint injection this morning.  The patient is seen by themselves.  The patient is Right handed    Onset: 1 month(s) ago. Reports insidious onset without acute precipitating event.  Location of Pain: right medial knee pain   Worsened by: walking, going up stairs, lying in bed  Better with: ice  Treatments tried: ice and ibuprofen  Associated symptoms: swelling and cracking     Orthopedic/Surgical history: YES -chronic left knee pain   Social History/Occupation:      No family history pertinent to patient's problem today.     REVIEW OF SYSTEMS:  Review of Systems  Constitutional, HEENT, cardiovascular, pulmonary, gi and gu systems are negative, except as otherwise noted.    OBJECTIVE:  /72   Ht 1.702 m (5' 7\")   Wt (!) 157.4 kg (347 lb)   BMI 54.35 kg/m     General: healthy, alert and in no distress  HEENT: no scleral icterus or conjunctival erythema  Skin: no suspicious lesions or rash. No jaundice.  CV: distal perfusion intact   Resp: normal respiratory effort without conversational dyspnea   Psych: normal mood and affect  Gait: normal steady gait with appropriate coordination and balance   Neuro/MSK: Normal light sensory exam of bilateral lower extremity   Motor: 5/5 with bilateral hip flexion, 5/5 in left knee extension, mild swelling in the right R. Knee, 4+/5 power in the R. knee limited by pain, + pain on " palpation of the R. medial knee joint line, R. knee Valgus stress test is positive, unable to extend to 0 degree, able to do 90 degree on flexion. Normal bilateral ankle dorsiflexion, EHL, or plantar flexion.   Reflexes: intact, 2+ and symmetric in bilateral upper and lower extremities with biceps, triceps, BR, patellar and achilles.  Back Exam: Deferred       RADIOLOGY:  I reviewed ordered, visualized and reviewed these images with the patient and Dr. Quiroz    9/29/2022 X-Rays Knee Standing.   Impression: Self  Possible R. Insufficiency fracture in the medial femoral condyle  not present in prior imaging on 01/29/2020      Review of external notes as documented elsewhere in note  Review of the result(s) of each unique test - right knee x-rays       Disclaimer: This note consists of symbols derived from keyboarding, dictation and/or voice recognition software. As a result, there may be errors in the script that have gone undetected. Please consider this when interpreting information found in this chart.    I was present with the resident during the history and exam.  I discussed the case with the resident and agree with the findings as documented in the assessment and plan.

## 2022-09-29 NOTE — PATIENT INSTRUCTIONS
# Right Knee Pain: Symptoms notable over the past month with worsening medial knee pain. She does have tenderness to palpation over the medial joint line with pain with walking. X-rays today showing concern for medial femoral condyle insufficiency fracture not seen on previous x-rays. Given this will plan to unload, get MRI and follow-up either in clinic or via video visit to go over the results. If positive plan to order medial  knee brace. Otherwise plan is below     Image Findings: concern today for medial femoral condyle insufficiency fracture on the right side  Treatment: Activities as tolerated, unload right leg either with crutches or with a cane, would offload to the point of being pain free, right knee MRI ordered  Job: as tolerated  Medications/Injections: Limited tylenol/ibuprofen for pain for 1-2 weeks, defer today  Follow-up: after MRI to go over the results and consideration of steroid injection if no insufficiency fracture is noted    Please call [phone]   Ask for my team if you have any questions or concerns    MRI Scheduling Appointments  Call: [phone]  Toll-Free: [phone]  Fax: [phone]      If you have not yet received the influenza vaccine but would like to get one, please call  [phone] or you can schedule via [employer]    It was great seeing you again today!    Dimitris Quiroz MD, [facility]

## 2022-10-05 ENCOUNTER — ANCILLARY PROCEDURE (OUTPATIENT)
Dept: MRI IMAGING | Facility: CLINIC | Age: 57
End: 2022-10-05
Attending: FAMILY MEDICINE
Payer: COMMERCIAL

## 2022-10-05 DIAGNOSIS — M25.561 ACUTE PAIN OF RIGHT KNEE: ICD-10-CM

## 2022-10-05 PROCEDURE — 73721 MRI JNT OF LWR EXTRE W/O DYE: CPT | Mod: TC | Performed by: RADIOLOGY

## 2022-10-06 ENCOUNTER — MYC MEDICAL ADVICE (OUTPATIENT)
Dept: ORTHOPEDICS | Facility: CLINIC | Age: 57
End: 2022-10-06
Payer: COMMERCIAL

## 2022-10-06 DIAGNOSIS — M17.0 BILATERAL PRIMARY OSTEOARTHRITIS OF KNEE: Primary | ICD-10-CM

## 2022-10-18 ENCOUNTER — OFFICE VISIT (OUTPATIENT)
Dept: ORTHOPEDICS | Facility: CLINIC | Age: 57
End: 2022-10-18
Payer: COMMERCIAL

## 2022-10-18 VITALS — WEIGHT: 293 LBS | BODY MASS INDEX: 45.99 KG/M2 | HEIGHT: 67 IN

## 2022-10-18 DIAGNOSIS — M17.11 ARTHRITIS OF RIGHT KNEE: Primary | ICD-10-CM

## 2022-10-18 PROCEDURE — 99213 OFFICE O/P EST LOW 20 MIN: CPT | Mod: 25 | Performed by: FAMILY MEDICINE

## 2022-10-18 PROCEDURE — 20611 DRAIN/INJ JOINT/BURSA W/US: CPT | Mod: RT | Performed by: FAMILY MEDICINE

## 2022-10-18 ASSESSMENT — PAIN SCALES - GENERAL: PAINLEVEL: SEVERE PAIN (6)

## 2022-10-18 NOTE — PATIENT INSTRUCTIONS
# Right Knee Arthritis: Symptoms notable over the past 2 months with worsening medial knee pain. She does have tenderness to palpation over the medial joint line with pain with walking. Reviewed MRI today showing no insufficiency fracture. Given this and previous Synvisc injection worked on the left side plan to treat as below and follow-up as needed.      Image Findings: Concern today for medial femoral condyle insufficiency fracture on the right side  Treatment: Activities as tolerated, work on home exercises  Job: as tolerated  Medications/Injections: Limited tylenol/ibuprofen for pain for 1-2 weeks, right knee Synvisc injection  Follow-up: as needed for repeat injections or if pain is worsening.     It was great seeing you again today!    Dimitris Quiroz    [facility] Injection Discharge Instructions    Procedure: right knee Synvisc injection    You may shower, however avoid swimming, tub baths or hot tubs for 24 hours following your procedure  You may have a mild to moderate increase in pain for several days following the injection.  It may take up to 14 days for the steroid medication to start working although you may feel the effect as early as a few days after the procedure.  You may use ice packs for 10-15 minutes, 3 to 4 times a day at the injection site for comfort  You may use anti-inflammatory medications (such as Ibuprofen or Aleve or Advil) or Tylenol for pain control if necessary  If you were fasting, you may resume your normal diet and medications after the procedure  If you have diabetes, check your blood sugar more frequently than usual as your blood sugar may be higher than normal for 10-14 days following a steroid injection. Contact your doctor who manages your diabetes if your blood sugar is higher than usual    If you experience any of the following, call [facility] @ [phone] or [phone]  -Fever over 100 degree F  -Swelling, bleeding, redness, drainage, warmth at the injection site  - New or  worsening pain

## 2022-10-18 NOTE — LETTER
"    10/18/2022         RE: Ashli Narvaez  [address]  [place] [address]        Dear Colleague,    Thank you for referring your patient, Ashli Narvaez, to the [facility] [place]. Please see a copy of my visit note below.    ASSESSMENT & PLAN    Ashli was seen today for follow up.    Diagnoses and all orders for this visit:    Arthritis of right knee  -     Large Joint Injection/Arthocentesis: R knee joint        # Right Knee Arthritis: Symptoms notable over the past 2 months with worsening medial knee pain. She does have tenderness to palpation over the medial joint line with pain with walking. Reviewed MRI today showing no insufficiency fracture. Given this and previous Synvisc injection worked on the left side plan to treat as below and follow-up as needed.      Image Findings: Concern today for medial femoral condyle insufficiency fracture on the right side  Treatment: Activities as tolerated, work on home exercises  Job: as tolerated  Medications/Injections: Limited tylenol/ibuprofen for pain for 1-2 weeks, right knee Synvisc injection  Follow-up: as needed for repeat injections or if pain is worsening.     -----    SUBJECTIVE:  Ashli Narvaez is a 57 year old female who is seen in follow-up for right knee pain. They were last seen 9/29/2022.  The patient is seen by themselves.    Since their last visit reports waxing and wanning right knee pain.  Right knee MRI 10/5/22. They indicate that their current pain level is 5-6/10. They have tried rest/activity avoidance, ice and ibuprofen.        Patient's past medical, surgical, social, and family histories were reviewed today and no changes are noted.    REVIEW OF SYSTEMS:  Constitutional: NEGATIVE for fever, chills, change in weight  Skin: NEGATIVE for worrisome rashes, moles or lesions  GI/: NEGATIVE for bowel or bladder changes  Neuro: NEGATIVE for weakness, dizziness or paresthesias    OBJECTIVE:  Ht 1.702 m (5' 7\")   " Wt (!) 157.4 kg (347 lb)   BMI 54.35 kg/m     General: healthy, alert and in no distress  HEENT: no scleral icterus or conjunctival erythema  Skin: no suspicious lesions or rash. No jaundice.  CV: regular rhythm by palpation, no pedal edema  Resp: normal respiratory effort without conversational dyspnea   Psych: normal mood and affect  Gait: normal steady gait with appropriate coordination and balance  Neuro: normal light touch sensory exam of the extremities.    MSK:    RIGHT KNEE  Inspection:    Normal alignment; no edema, erythema, or ecchymosis present  Palpation:    Tender about the lateral joint line and medial joint line. Remainder of bony and ligamentous landmarks are nontender.    No effusion is present    Patellofemoral crepitus is Present  Range of Motion:     00 extension to 1350 flexion  Strength:    Quadriceps 5/5, hamstrings 5/5, gastrocsoleus 5/5 and tibialis anterior 5/5    Extensor mechanism intact  Special Tests:    Positive: Patellar grind    Negative: MCL/valgus stress (0 & 30 deg), LCL/varus stress (0 & 30 deg), Lachman's, anterior drawer, posterior drawer, Ghislaine's      Independent visualization of the below image:  EXAM: MR KNEE RIGHT W/O CONTRAST  LOCATION: [facility]  DATE/TIME: 10/5/2022 8:03 AM     INDICATION: Knee pain.  COMPARISON: None.  TECHNIQUE: Unenhanced.     FINDINGS:     MEDIAL COMPARTMENT:   -Meniscus: There is a complex tear of the medial meniscus body and posterior horn/body junction with a dominant radial component (series 6, image 22, series 5, image 26).  -Cartilage: There is some low-grade chondral thinning over the medial femoral condyle, without a high-grade or full-thickness defect.     LATERAL COMPARTMENT:  -Meniscus: Normal.   -Cartilage: There is some focal partial-thickness fissuring over the lateral tibial plateau, without a high-grade or full-thickness defect.     PATELLOFEMORAL COMPARTMENT:   -Alignment: Patella midline. No subluxation.  There is lateral patellar tilt.   -Cartilage: There is some high-grade partial-thickness chondral loss over the patellar median ridge with subchondral edema (series 3, image 10).     CRUCIATE LIGAMENTS:   -ACL: No acute tear. There is some scarring of the anterior cruciate ligament which may relate to old injury.  -PCL: Normal.     COLLATERAL LIGAMENTS:   -Medial collateral ligament: There is some scarring of the MCL origin. No acute tear is seen.  -Lateral collateral ligament: Intact.     POSTEROMEDIAL CORNER:  -Distal semimembranosus tendon is normal.   -Pes anserine tendons are intact.     POSTEROLATERAL CORNER:   -Popliteal tendon is intact. No tendinopathy.  -Biceps femoris tendon is intact.     EXTENSOR MECHANISM:   -Quadriceps tendon: Normal.  -Patellar tendon: Normal.  -Patellofemoral ligaments and retinacula: Intact.     JOINT:   -Small effusion.     BONES:  -No fracture or concerning marrow replacing lesion. No insufficiency fracture as clinically questioned.     SOFT TISSUES:   -No popliteal cyst. No acute muscular injury or soft tissue mass. There is some soft tissue edema anterior to the patella.                                                                      IMPRESSION:  1.  No fracture or insufficiency fracture as clinically questioned.  2.  Complex tear of the medial meniscus body and posterior horn/body junction with a dominant radial component.  3.  Tricompartmental chondromalacia, most pronounced in the patellofemoral compartment with some focal high-grade partial thickness cartilage loss over the patellar median ridge with subchondral edema. No high-grade or full-thickness defects are seen in   the medial or lateral compartment.     Dimitris Quiroz MD, [facility]    Disclaimer: This note consists of symbols derived from keyboarding, dictation and/or voice recognition software. As a result, there may be errors in the script that have gone undetected. Please  consider this when interpreting information found in this chart.    Large Joint Injection/Arthocentesis: R knee joint    Date/Time: 10/18/2022 8:14 AM  Performed by: Dimitris Quiroz MD  Authorized by: Dimitris Quiroz MD     Indications:  Pain and osteoarthritis  Needle Size:  21 G  Guidance: ultrasound    Approach:  Superolateral  Location:  Knee      Medications:  48 mg hylan 48 MG/6ML  Outcome:  Tolerated well, no immediate complications  Procedure discussed: discussed risks, benefits, and alternatives    Consent Given by:  Patient  Timeout: timeout called immediately prior to procedure    Prep: patient was prepped and draped in usual sterile fashion     Ultrasound images of procedure were permanently stored.     Patient tolerated hyaluronic acid injection today.  Aftercare instructions given to patient.  Plan to follow-up as previously discussed with referring provider.  Ultrasound guided images were permanently stored.     Dimitris Quiroz MD [facility]                  Again, thank you for allowing me to participate in the care of your patient.        Sincerely,        Dimitris Quiroz MD

## 2022-10-18 NOTE — PROGRESS NOTES
"ASSESSMENT & PLAN    Ashli was seen today for follow up.    Diagnoses and all orders for this visit:    Arthritis of right knee  -     Large Joint Injection/Arthocentesis: R knee joint        # Right Knee Arthritis: Symptoms notable over the past 2 months with worsening medial knee pain. She does have tenderness to palpation over the medial joint line with pain with walking. Reviewed MRI today showing no insufficiency fracture. Given this and previous Synvisc injection worked on the left side plan to treat as below and follow-up as needed.      Image Findings: Concern today for medial femoral condyle insufficiency fracture on the right side  Treatment: Activities as tolerated, work on home exercises  Job: as tolerated  Medications/Injections: Limited tylenol/ibuprofen for pain for 1-2 weeks, right knee Synvisc injection  Follow-up: as needed for repeat injections or if pain is worsening.     -----    SUBJECTIVE:  Ashli Narvaez is a 57 year old female who is seen in follow-up for right knee pain. They were last seen 9/29/2022.  The patient is seen by themselves.    Since their last visit reports waxing and wanning right knee pain.  Right knee MRI 10/5/22. They indicate that their current pain level is 5-6/10. They have tried rest/activity avoidance, ice and ibuprofen.        Patient's past medical, surgical, social, and family histories were reviewed today and no changes are noted.    REVIEW OF SYSTEMS:  Constitutional: NEGATIVE for fever, chills, change in weight  Skin: NEGATIVE for worrisome rashes, moles or lesions  GI/: NEGATIVE for bowel or bladder changes  Neuro: NEGATIVE for weakness, dizziness or paresthesias    OBJECTIVE:  Ht 1.702 m (5' 7\")   Wt (!) 157.4 kg (347 lb)   BMI 54.35 kg/m     General: healthy, alert and in no distress  HEENT: no scleral icterus or conjunctival erythema  Skin: no suspicious lesions or rash. No jaundice.  CV: regular rhythm by palpation, no pedal edema  Resp: normal " respiratory effort without conversational dyspnea   Psych: normal mood and affect  Gait: normal steady gait with appropriate coordination and balance  Neuro: normal light touch sensory exam of the extremities.    MSK:    RIGHT KNEE  Inspection:    Normal alignment; no edema, erythema, or ecchymosis present  Palpation:    Tender about the lateral joint line and medial joint line. Remainder of bony and ligamentous landmarks are nontender.    No effusion is present    Patellofemoral crepitus is Present  Range of Motion:     00 extension to 1350 flexion  Strength:    Quadriceps 5/5, hamstrings 5/5, gastrocsoleus 5/5 and tibialis anterior 5/5    Extensor mechanism intact  Special Tests:    Positive: Patellar grind    Negative: MCL/valgus stress (0 & 30 deg), LCL/varus stress (0 & 30 deg), Lachman's, anterior drawer, posterior drawer, Ghislaine's      Independent visualization of the below image:  EXAM: MR KNEE RIGHT W/O CONTRAST  LOCATION: [facility]  DATE/TIME: 10/5/2022 8:03 AM     INDICATION: Knee pain.  COMPARISON: None.  TECHNIQUE: Unenhanced.     FINDINGS:     MEDIAL COMPARTMENT:   -Meniscus: There is a complex tear of the medial meniscus body and posterior horn/body junction with a dominant radial component (series 6, image 22, series 5, image 26).  -Cartilage: There is some low-grade chondral thinning over the medial femoral condyle, without a high-grade or full-thickness defect.     LATERAL COMPARTMENT:  -Meniscus: Normal.   -Cartilage: There is some focal partial-thickness fissuring over the lateral tibial plateau, without a high-grade or full-thickness defect.     PATELLOFEMORAL COMPARTMENT:   -Alignment: Patella midline. No subluxation. There is lateral patellar tilt.   -Cartilage: There is some high-grade partial-thickness chondral loss over the patellar median ridge with subchondral edema (series 3, image 10).     CRUCIATE LIGAMENTS:   -ACL: No acute tear. There is some scarring of the  anterior cruciate ligament which may relate to old injury.  -PCL: Normal.     COLLATERAL LIGAMENTS:   -Medial collateral ligament: There is some scarring of the MCL origin. No acute tear is seen.  -Lateral collateral ligament: Intact.     POSTEROMEDIAL CORNER:  -Distal semimembranosus tendon is normal.   -Pes anserine tendons are intact.     POSTEROLATERAL CORNER:   -Popliteal tendon is intact. No tendinopathy.  -Biceps femoris tendon is intact.     EXTENSOR MECHANISM:   -Quadriceps tendon: Normal.  -Patellar tendon: Normal.  -Patellofemoral ligaments and retinacula: Intact.     JOINT:   -Small effusion.     BONES:  -No fracture or concerning marrow replacing lesion. No insufficiency fracture as clinically questioned.     SOFT TISSUES:   -No popliteal cyst. No acute muscular injury or soft tissue mass. There is some soft tissue edema anterior to the patella.                                                                      IMPRESSION:  1.  No fracture or insufficiency fracture as clinically questioned.  2.  Complex tear of the medial meniscus body and posterior horn/body junction with a dominant radial component.  3.  Tricompartmental chondromalacia, most pronounced in the patellofemoral compartment with some focal high-grade partial thickness cartilage loss over the patellar median ridge with subchondral edema. No high-grade or full-thickness defects are seen in   the medial or lateral compartment.     Dimitris Quiroz MD, [facility]    Disclaimer: This note consists of symbols derived from keyboarding, dictation and/or voice recognition software. As a result, there may be errors in the script that have gone undetected. Please consider this when interpreting information found in this chart.    Large Joint Injection/Arthocentesis: R knee joint    Date/Time: 10/18/2022 8:14 AM  Performed by: Dimitris Quiroz MD  Authorized by: Dimitris Quiroz MD     Indications:  Pain and  osteoarthritis  Needle Size:  21 G  Guidance: ultrasound    Approach:  Superolateral  Location:  Knee      Medications:  48 mg hylan 48 MG/6ML  Outcome:  Tolerated well, no immediate complications  Procedure discussed: discussed risks, benefits, and alternatives    Consent Given by:  Patient  Timeout: timeout called immediately prior to procedure    Prep: patient was prepped and draped in usual sterile fashion     Ultrasound images of procedure were permanently stored.     Patient tolerated hyaluronic acid injection today.  Aftercare instructions given to patient.  Plan to follow-up as previously discussed with referring provider.  Ultrasound guided images were permanently stored.     Dimitris Quiroz MD [facility]

## 2023-06-04 ENCOUNTER — HEALTH MAINTENANCE LETTER (OUTPATIENT)
Age: 58
End: 2023-06-04

## 2024-06-19 ENCOUNTER — APPOINTMENT (OUTPATIENT)
Dept: LAB | Facility: CLINIC | Age: 59
End: 2024-06-19

## 2024-07-14 ENCOUNTER — HEALTH MAINTENANCE LETTER (OUTPATIENT)
Age: 59
End: 2024-07-14

## 2025-07-19 ENCOUNTER — HEALTH MAINTENANCE LETTER (OUTPATIENT)
Age: 60
End: 2025-07-19